# Patient Record
Sex: FEMALE | Race: WHITE | Employment: OTHER | URBAN - NONMETROPOLITAN AREA
[De-identification: names, ages, dates, MRNs, and addresses within clinical notes are randomized per-mention and may not be internally consistent; named-entity substitution may affect disease eponyms.]

---

## 2021-07-31 ENCOUNTER — HOSPITAL ENCOUNTER (INPATIENT)
Age: 61
LOS: 3 days | Discharge: HOME | DRG: 690 | End: 2021-08-03
Attending: EMERGENCY MEDICINE | Admitting: HOSPITALIST
Payer: MEDICARE

## 2021-07-31 ENCOUNTER — APPOINTMENT (OUTPATIENT)
Dept: GENERAL RADIOLOGY | Age: 61
DRG: 690 | End: 2021-07-31
Payer: MEDICARE

## 2021-07-31 DIAGNOSIS — R53.1 GENERALIZED WEAKNESS: ICD-10-CM

## 2021-07-31 DIAGNOSIS — N30.00 ACUTE CYSTITIS WITHOUT HEMATURIA: Primary | ICD-10-CM

## 2021-07-31 DIAGNOSIS — N17.9 AKI (ACUTE KIDNEY INJURY) (HCC): ICD-10-CM

## 2021-07-31 DIAGNOSIS — R41.82 ALTERED MENTAL STATUS, UNSPECIFIED ALTERED MENTAL STATUS TYPE: ICD-10-CM

## 2021-07-31 LAB
ALBUMIN SERPL-MCNC: 2.9 G/DL (ref 3.5–5.2)
ALP BLD-CCNC: 127 U/L (ref 35–104)
ALT SERPL-CCNC: 31 U/L (ref 5–33)
ANION GAP SERPL CALCULATED.3IONS-SCNC: 13 MMOL/L (ref 7–19)
AST SERPL-CCNC: 44 U/L (ref 5–32)
BACTERIA: ABNORMAL /HPF
BANDED NEUTROPHILS RELATIVE PERCENT: 4 % (ref 0–5)
BASOPHILS ABSOLUTE: 0 K/UL (ref 0–0.2)
BASOPHILS RELATIVE PERCENT: 0 % (ref 0–1)
BILIRUB SERPL-MCNC: 0.4 MG/DL (ref 0.2–1.2)
BILIRUBIN URINE: NEGATIVE
BLOOD, URINE: ABNORMAL
BUN BLDV-MCNC: 28 MG/DL (ref 8–23)
CALCIUM SERPL-MCNC: 8.1 MG/DL (ref 8.8–10.2)
CHLORIDE BLD-SCNC: 106 MMOL/L (ref 98–111)
CLARITY: ABNORMAL
CO2: 16 MMOL/L (ref 22–29)
COLOR: YELLOW
CREAT SERPL-MCNC: 1.5 MG/DL (ref 0.5–0.9)
EOSINOPHILS ABSOLUTE: 0.09 K/UL (ref 0–0.6)
EOSINOPHILS RELATIVE PERCENT: 1 % (ref 0–5)
EPITHELIAL CELLS, UA: ABNORMAL /HPF
GFR AFRICAN AMERICAN: 43
GFR NON-AFRICAN AMERICAN: 35
GLUCOSE BLD-MCNC: 111 MG/DL (ref 74–109)
GLUCOSE URINE: NEGATIVE MG/DL
HCT VFR BLD CALC: 29.2 % (ref 37–47)
HEMOGLOBIN: 9.3 G/DL (ref 12–16)
IMMATURE GRANULOCYTES #: 0.1 K/UL
KETONES, URINE: NEGATIVE MG/DL
LEUKOCYTE ESTERASE, URINE: ABNORMAL
LYMPHOCYTES ABSOLUTE: 0.8 K/UL (ref 1.1–4.5)
LYMPHOCYTES RELATIVE PERCENT: 9 % (ref 20–40)
MCH RBC QN AUTO: 30.2 PG (ref 27–31)
MCHC RBC AUTO-ENTMCNC: 31.8 G/DL (ref 33–37)
MCV RBC AUTO: 94.8 FL (ref 81–99)
METAMYELOCYTES RELATIVE PERCENT: 2 %
MONOCYTES ABSOLUTE: 0.2 K/UL (ref 0–0.9)
MONOCYTES RELATIVE PERCENT: 2 % (ref 0–10)
NEUTROPHILS ABSOLUTE: 8.3 K/UL (ref 1.5–7.5)
NEUTROPHILS RELATIVE PERCENT: 82 % (ref 50–65)
NITRITE, URINE: POSITIVE
PDW BLD-RTO: 13.8 % (ref 11.5–14.5)
PH UA: 7 (ref 5–8)
PLATELET # BLD: 146 K/UL (ref 130–400)
PLATELET SLIDE REVIEW: ABNORMAL
PMV BLD AUTO: 12 FL (ref 9.4–12.3)
POTASSIUM SERPL-SCNC: 3.6 MMOL/L (ref 3.5–5)
PROTEIN UA: 100 MG/DL
RBC # BLD: 3.08 M/UL (ref 4.2–5.4)
RBC # BLD: NORMAL 10*6/UL
RBC UA: ABNORMAL /HPF (ref 0–2)
SARS-COV-2, NAAT: NOT DETECTED
SODIUM BLD-SCNC: 135 MMOL/L (ref 136–145)
SPECIFIC GRAVITY UA: 1.01 (ref 1–1.03)
TOTAL PROTEIN: 6 G/DL (ref 6.6–8.7)
UROBILINOGEN, URINE: 1 E.U./DL
VACUOLATED NEUTROPHILS: ABNORMAL
WBC # BLD: 9.4 K/UL (ref 4.8–10.8)
WBC UA: ABNORMAL /HPF (ref 0–5)

## 2021-07-31 PROCEDURE — 2580000003 HC RX 258: Performed by: EMERGENCY MEDICINE

## 2021-07-31 PROCEDURE — 71045 X-RAY EXAM CHEST 1 VIEW: CPT

## 2021-07-31 PROCEDURE — 85025 COMPLETE CBC W/AUTO DIFF WBC: CPT

## 2021-07-31 PROCEDURE — 6360000002 HC RX W HCPCS: Performed by: HOSPITALIST

## 2021-07-31 PROCEDURE — 93005 ELECTROCARDIOGRAM TRACING: CPT

## 2021-07-31 PROCEDURE — 80053 COMPREHEN METABOLIC PANEL: CPT

## 2021-07-31 PROCEDURE — 1210000000 HC MED SURG R&B

## 2021-07-31 PROCEDURE — 6360000002 HC RX W HCPCS: Performed by: EMERGENCY MEDICINE

## 2021-07-31 PROCEDURE — 87635 SARS-COV-2 COVID-19 AMP PRB: CPT

## 2021-07-31 PROCEDURE — 87186 SC STD MICRODIL/AGAR DIL: CPT

## 2021-07-31 PROCEDURE — 2580000003 HC RX 258: Performed by: HOSPITALIST

## 2021-07-31 PROCEDURE — 87077 CULTURE AEROBIC IDENTIFY: CPT

## 2021-07-31 PROCEDURE — 87086 URINE CULTURE/COLONY COUNT: CPT

## 2021-07-31 PROCEDURE — 36415 COLL VENOUS BLD VENIPUNCTURE: CPT

## 2021-07-31 PROCEDURE — 81001 URINALYSIS AUTO W/SCOPE: CPT

## 2021-07-31 PROCEDURE — 99284 EMERGENCY DEPT VISIT MOD MDM: CPT

## 2021-07-31 RX ORDER — LEVOTHYROXINE SODIUM 0.1 MG/1
50 TABLET ORAL DAILY
COMMUNITY

## 2021-07-31 RX ORDER — TOPIRAMATE 25 MG/1
50 TABLET ORAL DAILY
COMMUNITY
End: 2022-07-20 | Stop reason: ALTCHOICE

## 2021-07-31 RX ORDER — POTASSIUM CHLORIDE 20 MEQ/1
40 TABLET, EXTENDED RELEASE ORAL PRN
Status: DISCONTINUED | OUTPATIENT
Start: 2021-07-31 | End: 2021-08-03 | Stop reason: HOSPADM

## 2021-07-31 RX ORDER — ONDANSETRON 4 MG/1
4 TABLET, ORALLY DISINTEGRATING ORAL EVERY 8 HOURS PRN
Status: DISCONTINUED | OUTPATIENT
Start: 2021-07-31 | End: 2021-08-03 | Stop reason: HOSPADM

## 2021-07-31 RX ORDER — POLYETHYLENE GLYCOL 3350 17 G/17G
17 POWDER, FOR SOLUTION ORAL DAILY PRN
Status: DISCONTINUED | OUTPATIENT
Start: 2021-07-31 | End: 2021-08-03 | Stop reason: HOSPADM

## 2021-07-31 RX ORDER — BUPROPION HYDROCHLORIDE 200 MG/1
200 TABLET, EXTENDED RELEASE ORAL DAILY
COMMUNITY
End: 2022-10-05 | Stop reason: ALTCHOICE

## 2021-07-31 RX ORDER — ACETAMINOPHEN 650 MG/1
650 SUPPOSITORY RECTAL EVERY 6 HOURS PRN
Status: DISCONTINUED | OUTPATIENT
Start: 2021-07-31 | End: 2021-08-03 | Stop reason: HOSPADM

## 2021-07-31 RX ORDER — 0.9 % SODIUM CHLORIDE 0.9 %
1000 INTRAVENOUS SOLUTION INTRAVENOUS ONCE
Status: COMPLETED | OUTPATIENT
Start: 2021-07-31 | End: 2021-07-31

## 2021-07-31 RX ORDER — ACETAMINOPHEN 325 MG/1
650 TABLET ORAL EVERY 6 HOURS PRN
Status: DISCONTINUED | OUTPATIENT
Start: 2021-07-31 | End: 2021-08-03 | Stop reason: HOSPADM

## 2021-07-31 RX ORDER — ESOMEPRAZOLE MAGNESIUM 40 MG/1
40 FOR SUSPENSION ORAL 2 TIMES DAILY
COMMUNITY

## 2021-07-31 RX ORDER — SODIUM CHLORIDE 9 MG/ML
INJECTION, SOLUTION INTRAVENOUS CONTINUOUS
Status: DISCONTINUED | OUTPATIENT
Start: 2021-07-31 | End: 2021-08-01

## 2021-07-31 RX ORDER — CLONAZEPAM 1 MG/1
1 TABLET ORAL 3 TIMES DAILY
COMMUNITY

## 2021-07-31 RX ORDER — CYANOCOBALAMIN 1000 UG/ML
1000 INJECTION INTRAMUSCULAR; SUBCUTANEOUS
COMMUNITY

## 2021-07-31 RX ORDER — SODIUM CHLORIDE 0.9 % (FLUSH) 0.9 %
5-40 SYRINGE (ML) INJECTION EVERY 12 HOURS SCHEDULED
Status: DISCONTINUED | OUTPATIENT
Start: 2021-07-31 | End: 2021-08-03 | Stop reason: HOSPADM

## 2021-07-31 RX ORDER — ATORVASTATIN CALCIUM 40 MG/1
40 TABLET, FILM COATED ORAL DAILY
COMMUNITY

## 2021-07-31 RX ORDER — SERTRALINE HYDROCHLORIDE 100 MG/1
200 TABLET, FILM COATED ORAL DAILY
COMMUNITY
End: 2022-07-20 | Stop reason: ALTCHOICE

## 2021-07-31 RX ORDER — QUETIAPINE FUMARATE 200 MG/1
200 TABLET, FILM COATED ORAL NIGHTLY
COMMUNITY

## 2021-07-31 RX ORDER — HYDROCODONE BITARTRATE AND ACETAMINOPHEN 10; 325 MG/1; MG/1
1 TABLET ORAL EVERY 8 HOURS PRN
COMMUNITY

## 2021-07-31 RX ORDER — ONDANSETRON 2 MG/ML
4 INJECTION INTRAMUSCULAR; INTRAVENOUS EVERY 6 HOURS PRN
Status: DISCONTINUED | OUTPATIENT
Start: 2021-07-31 | End: 2021-08-03 | Stop reason: HOSPADM

## 2021-07-31 RX ORDER — ONDANSETRON 8 MG/1
8 TABLET, ORALLY DISINTEGRATING ORAL EVERY 8 HOURS PRN
COMMUNITY

## 2021-07-31 RX ORDER — RIZATRIPTAN BENZOATE 10 MG/1
10 TABLET ORAL
COMMUNITY

## 2021-07-31 RX ORDER — MAGNESIUM SULFATE IN WATER 40 MG/ML
2000 INJECTION, SOLUTION INTRAVENOUS PRN
Status: DISCONTINUED | OUTPATIENT
Start: 2021-07-31 | End: 2021-08-03 | Stop reason: HOSPADM

## 2021-07-31 RX ORDER — GABAPENTIN 300 MG/1
600 CAPSULE ORAL NIGHTLY
COMMUNITY

## 2021-07-31 RX ORDER — POTASSIUM CHLORIDE 7.45 MG/ML
10 INJECTION INTRAVENOUS PRN
Status: DISCONTINUED | OUTPATIENT
Start: 2021-07-31 | End: 2021-08-03 | Stop reason: HOSPADM

## 2021-07-31 RX ORDER — CELECOXIB 200 MG/1
200 CAPSULE ORAL 2 TIMES DAILY
COMMUNITY

## 2021-07-31 RX ORDER — SODIUM CHLORIDE 9 MG/ML
25 INJECTION, SOLUTION INTRAVENOUS PRN
Status: DISCONTINUED | OUTPATIENT
Start: 2021-07-31 | End: 2021-08-03 | Stop reason: HOSPADM

## 2021-07-31 RX ORDER — SODIUM CHLORIDE 0.9 % (FLUSH) 0.9 %
5-40 SYRINGE (ML) INJECTION PRN
Status: DISCONTINUED | OUTPATIENT
Start: 2021-07-31 | End: 2021-08-03 | Stop reason: HOSPADM

## 2021-07-31 RX ADMIN — ENOXAPARIN SODIUM 40 MG: 40 INJECTION SUBCUTANEOUS at 18:54

## 2021-07-31 RX ADMIN — SODIUM CHLORIDE 1000 ML: 9 INJECTION, SOLUTION INTRAVENOUS at 12:07

## 2021-07-31 RX ADMIN — SODIUM CHLORIDE: 9 INJECTION, SOLUTION INTRAVENOUS at 18:55

## 2021-07-31 RX ADMIN — CEFTRIAXONE 1000 MG: 1 INJECTION, POWDER, FOR SOLUTION INTRAMUSCULAR; INTRAVENOUS at 12:46

## 2021-07-31 ASSESSMENT — ENCOUNTER SYMPTOMS
BACK PAIN: 0
COUGH: 1
VOMITING: 0
DIARRHEA: 0
ABDOMINAL PAIN: 0
NAUSEA: 0
SHORTNESS OF BREATH: 0

## 2021-07-31 NOTE — Clinical Note
Patient Class: Inpatient [101]   REQUIRED: Diagnosis: AMS (altered mental status) [[id number]]   Estimated Length of Stay: Estimated stay of more than 2 midnights   Future Attending Provider: Kim Cameron [[id number]]   Admitting Provider: iKm Cameron [[id number]]

## 2021-07-31 NOTE — ED NOTES
Bed: 04  Expected date:   Expected time:   Means of arrival:   Comments:  NGOC Hubbard  07/31/21 1041

## 2021-07-31 NOTE — ED PROVIDER NOTES
[facility] EMERGENCY DEPT  eMERGENCY dEPARTMENT eNCOUnter      Pt Name: Tracey Hoyt  MRN: [id number]  [place] 1960  Date of evaluation: 7/31/2021  Provider: Heladio Jackson MD    [address] [place] [address]       Chief Complaint   Patient presents with    Fatigue    Fever         HISTORY OF PRESENT ILLNESS   (Location/Symptom, Timing/Onset,Context/Setting, Quality, Duration, Modifying Factors, Severity)  Note limiting factors. Tracey Hoyt is a 57 y.o. female who presents to the emergency department for generalized weakness and fatigue. Per the patient she began to feel somewhat dizzy and fatigued on Thursday. She had a T-max of 103 and had a fever of 101.5 this morning. She admits to mild cough. She denies any UTI symptoms. No GI symptoms. HPI    NursingNotes were reviewed. REVIEW OF SYSTEMS    (2-9 systems for level 4, 10 or more for level 5)     Review of Systems   Constitutional: Positive for fever. Negative for chills. Respiratory: Positive for cough. Negative for shortness of breath. Cardiovascular: Negative for chest pain. Gastrointestinal: Negative for abdominal pain, diarrhea, nausea and vomiting. Genitourinary: Negative for dysuria and frequency. Musculoskeletal: Negative for back pain and neck pain. Neurological: Negative for dizziness and headaches. Psychiatric/Behavioral: Positive for confusion. All other systems reviewed and are negative. PAST MEDICALHISTORY     Past Medical History:   Diagnosis Date    Bipolar 1 disorder ([facility] [address]     Depression     Hyperlipidemia          SURGICAL HISTORY       Past Surgical History:   Procedure Laterality Date    HAND SURGERY      KNEE SURGERY Bilateral          CURRENT MEDICATIONS     Previous Medications    No medications on file       ALLERGIES     Sulfa antibiotics    FAMILY HISTORY     History reviewed. No pertinent family history.        SOCIAL HISTORY       Social History     Socioeconomic History    Marital status:      Spouse name: None    Number of children: None    Years of education: None    Highest education level: None   Occupational History    None   Tobacco Use    Smoking status: Never Smoker    Smokeless tobacco: Never Used   Substance and Sexual Activity    Alcohol use: Not Currently    Drug use: Yes     Types: Marijuana     Comment: occasional    Sexual activity: None   Other Topics Concern    None   Social History Narrative    None     Social Determinants of Health     Financial Resource Strain:     Difficulty of Paying Living Expenses:    Food Insecurity:     Worried About Running Out of Food in the Last Year:     Ran Out of Food in the Last Year:    Transportation Needs:     Lack of Transportation (Medical):  Lack of Transportation (Non-Medical):    Physical Activity:     Days of Exercise per Week:     Minutes of Exercise per Session:    Stress:     Feeling of Stress :    Social Connections:     Frequency of Communication with Friends and Family:     Frequency of Social Gatherings with Friends and Family:     Attends [employer] Services:     Active Member of Clubs or Organizations:     Attends Club or Organization Meetings:     Marital Status:    Intimate Partner Violence:     Fear of Current or Ex-Partner:     Emotionally Abused:     Physically Abused:     Sexually Abused:        SCREENINGS    [place] Coma Scale  Eye Opening: Spontaneous  Best Verbal Response: Oriented  Best Motor Response: Obeys commands  [place] Coma Scale Score: 15        PHYSICAL EXAM    (up to 7 for level 4, 8 or more for level 5)     ED Triage Vitals   BP Temp Temp Source Pulse Resp SpO2 Height Weight   07/31/21 1049 07/31/21 1051 07/31/21 1051 07/31/21 1049 07/31/21 1049 07/31/21 1049 07/31/21 1049 07/31/21 1049   104/63 98.6 °F (37 °C) Oral 89 18 (!) 89 % 5' 4\" (1.626 m) 142 lb (64.4 kg)       Physical Exam  Vitals and nursing note reviewed. Constitutional:       General: She is not in acute distress.      Appearance: Normal appearance. She is well-developed. She is ill-appearing. She is not diaphoretic. HENT:      Head: Normocephalic and atraumatic. Right Ear: External ear normal.      Left Ear: External ear normal.      Nose: Nose normal.      Mouth/Throat:      Mouth: Mucous membranes are moist.   Eyes:      Extraocular Movements: Extraocular movements intact. Conjunctiva/sclera: Conjunctivae normal.      Pupils: Pupils are equal, round, and reactive to light. Neck:      Trachea: No tracheal deviation. Cardiovascular:      Rate and Rhythm: Normal rate and regular rhythm. Heart sounds: Normal heart sounds. No murmur heard. Pulmonary:      Effort: No respiratory distress. Breath sounds: Normal breath sounds. No wheezing or rales. Abdominal:      Palpations: Abdomen is soft. There is no mass. Tenderness: There is no abdominal tenderness. Musculoskeletal:         General: Normal range of motion. Cervical back: Normal range of motion. Skin:     General: Skin is warm and dry. Neurological:      Mental Status: She is alert and oriented to person, place, and time. GCS: GCS eye subscore is 4. GCS verbal subscore is 5. GCS motor subscore is 6. Cranial Nerves: No dysarthria or facial asymmetry. Sensory: Sensation is intact. Motor: No abnormal muscle tone. Coordination: Coordination normal.         DIAGNOSTIC RESULTS     EKG: All EKG's areinterpreted by the Emergency Department Physician who either signs or Co-signs this chart in the absence of a cardiologist.    75 normal sinus rhythm no ST changes nondiagnostic EKG    RADIOLOGY:  Non-plain film images such as CT, Ultrasound and MRI are read by the radiologist. Plain radiographic images are visualized and preliminarily interpreted bythe emergency physician with the below findings:          XR CHEST PORTABLE   Final Result   1. No acute cardiopulmonary finding.     Signed by Dr Bailey Saenz:  Labs Reviewed   CBC WITH AUTO DIFFERENTIAL - Abnormal; Notable for the following components:       Result Value    RBC 3.08 (*)     Hemoglobin 9.3 (*)     Hematocrit 29.2 (*)     MCHC 31.8 (*)     Neutrophils % 82.0 (*)     Lymphocytes % 9.0 (*)     Neutrophils Absolute 8.3 (*)     Lymphocytes Absolute 0.8 (*)     Metamyelocytes Relative 2 (*)     Vacuolated Neutrophils 1+ (*)     All other components within normal limits   COMPREHENSIVE METABOLIC PANEL - Abnormal; Notable for the following components:    Sodium 135 (*)     CO2 16 (*)     Glucose 111 (*)     BUN 28 (*)     CREATININE 1.5 (*)     GFR Non- 35 (*)     GFR  43 (*)     Calcium 8.1 (*)     Total Protein 6.0 (*)     Albumin 2.9 (*)     Alkaline Phosphatase 127 (*)     AST 44 (*)     All other components within normal limits   URINE RT REFLEX TO CULTURE - Abnormal; Notable for the following components:    Clarity, UA CLOUDY (*)     Blood, Urine LARGE (*)     Protein,  (*)     Nitrite, Urine POSITIVE (*)     Leukocyte Esterase, Urine LARGE (*)     All other components within normal limits   MICROSCOPIC URINALYSIS - Abnormal; Notable for the following components:    WBC, UA 50-75 (*)     Bacteria, UA 2+ (*)     All other components within normal limits   COVID-19, RAPID   CULTURE, URINE       All other labs were within normal range or not returned as of this dictation.     EMERGENCY DEPARTMENT COURSE and DIFFERENTIAL DIAGNOSIS/MDM:   Vitals:    Vitals:    07/31/21 1051 07/31/21 1105 07/31/21 1200 07/31/21 1300   BP:  110/66 106/62 100/64   Pulse:  84 82 84   Resp:  18 17 18   Temp: 98.6 °F (37 °C)      TempSrc: Oral      SpO2:  93% 93% 94%   Weight:       Height:           MDM  Number of Diagnoses or Management Options     Amount and/or Complexity of Data Reviewed  Clinical lab tests: ordered and reviewed  Tests in the radiology section of CPT®: ordered and reviewed  Discuss the patient with other providers: yes  Independent visualization of images, tracings, or specimens: yes      VSS, pt ill appearing mild encephalopathy here slow to answer questions and somewhat sleepy, UTI and JEFF main issues and likely etiology, have d/w Dr. Edwin Perez for admission      CONSULTS:  None    PROCEDURES:  Unless otherwise noted below, none     Procedures    FINAL IMPRESSION      1. Acute cystitis without hematuria    2. Generalized weakness    3. Altered mental status, unspecified altered mental status type    4. JEFF (acute kidney injury) [facility])          DISPOSITION/PLAN   DISPOSITION Decision To Admit 07/31/2021 01:31:21 PM      PATIENT REFERRED TO:  No follow-up provider specified.     DISCHARGE MEDICATIONS:  New Prescriptions    No medications on file          (Please note that portions of this note were completed with a voice recognition program.  Efforts were made to edit thedictations but occasionally words are mis-transcribed.)    Joel Katz MD (electronically signed)  Attending Emergency Physician        Fernando Burns MD  07/31/21 9981

## 2021-07-31 NOTE — H&P
HISTORY AND PHYSICAL             Date: 7/31/2021        Patient Name: Alana Pressley     YOB: 1960      Age:  57 y.o. Chief Complaint     Chief Complaint   Patient presents with    Fatigue    Fever       History Obtained From   patient    History of Present Illness     43-year-old lady with a history of bipolar 1 disorder, depression, hyperlipidemia, presents to the hospital with concerns of generalized weakness and fever. Since she has been progressively feeling dizzy since Thursday needing some more generalized weakness, today she took her temperature at home which was 103, took ibuprofen with improvement to 101. She denied any recent travel, she denied any cough, he denied any dysuria urgency frequency. Due to continuous dizziness presented to emergency room for further evaluation. In the ED blood pressure noted to be in the mid 100, lab elevated BUN of 29 creatinine of 1.5, white blood cell normal at 9.4, urinalysis grossly positive for urinary tract infection was given ceftriaxone 1 L of fluid and admitted for further evaluation. Past Medical History     Past Medical History:   Diagnosis Date    Bipolar 1 disorder ([facility] [address]     Depression     Hyperlipidemia         Past Surgical History     Past Surgical History:   Procedure Laterality Date    HAND SURGERY      KNEE SURGERY Bilateral         Medications Prior to Admission     Prior to Admission medications    Not on File        Allergies   Sulfa antibiotics    Social History     Social History     Tobacco History     Smoking Status  Never Smoker    Smokeless Tobacco Use  Never Used          Alcohol History     Alcohol Use Status  Not Currently          Drug Use     Drug Use Status  Yes Types  Marijuana Comment  occasional          Sexual Activity     Sexually Active  Not Asked                Family History   History reviewed. No pertinent family history.     Review of Systems   Review of Systems: 16 point system reviewed and negative suggested above. Physical Exam   /62   Pulse 78   Temp 98.6 °F (37 °C) (Oral)   Resp 17   Ht 5' 4\" (1.626 m)   Wt 142 lb (64.4 kg)   SpO2 94%   BMI 24.37 kg/m²         Physical Exam  General: Alert, well-developed, no acute distress lying comfortably in bed. HEENT: Atraumatic normocephalic, range of motion normal, no JVD, no tracheal deviation noted. Cardiac: Normal S1-S2 no murmurs rub or gallop. Respiratory: Effort normal, breath sounds normal, clear To auscultation bilaterally, no rhonchi or rales, no wheezing  Abdomen: Soft, positive bowel sounds in all quadrants, no distention, nontender to palpation, no organomegaly noted, no rebound noted, no CVA tenderness. MSK/extremities: no tenderness, no edema, no deformity, moves all extremities  Skin: Warm, no erythema noted, no bruising and no lesions noted. Psych: Affect flat and good eye contact, behavioral normal  Neurological: No focal deficits, alert and conversant, no formal disorientation noted. No sensory deficits, no abnormal coordination.         Labs      Recent Results (from the past 24 hour(s))   CBC Auto Differential    Collection Time: 07/31/21 10:45 AM   Result Value Ref Range    WBC 9.4 4.8 - 10.8 K/uL    RBC 3.08 (L) 4.20 - 5.40 M/uL    Hemoglobin 9.3 (L) 12.0 - 16.0 g/dL    Hematocrit 29.2 (L) 37.0 - 47.0 %    MCV 94.8 81.0 - 99.0 fL    MCH 30.2 27.0 - 31.0 pg    MCHC 31.8 (L) 33.0 - 37.0 g/dL    RDW 13.8 11.5 - 14.5 %    Platelets 205 930 - 576 K/uL    MPV 12.0 9.4 - 12.3 fL    PLATELET SLIDE REVIEW Decreased     Neutrophils % 82.0 (H) 50.0 - 65.0 %    Lymphocytes % 9.0 (L) 20.0 - 40.0 %    Monocytes % 2.0 0.0 - 10.0 %    Eosinophils % 1.0 0.0 - 5.0 %    Basophils % 0.0 0.0 - 1.0 %    Neutrophils Absolute 8.3 (H) 1.5 - 7.5 K/uL    Immature Granulocytes # 0.1 K/uL    Lymphocytes Absolute 0.8 (L) 1.1 - 4.5 K/uL    Monocytes Absolute 0.20 0.00 - 0.90 K/uL    Eosinophils Absolute 0.09 0.00 - 0.60 K/uL    Basophils Absolute 0.00 0.00 - 0.20 K/uL    Bands Relative 4 0 - 5 %    Metamyelocytes Relative 2 (A) %    Vacuolated Neutrophils 1+ (A)     RBC Morphology Normal    Comprehensive Metabolic Panel    Collection Time: 07/31/21 10:45 AM   Result Value Ref Range    Sodium 135 (L) 136 - 145 mmol/L    Potassium 3.6 3.5 - 5.0 mmol/L    Chloride 106 98 - 111 mmol/L    CO2 16 (L) 22 - 29 mmol/L    Anion Gap 13 7 - 19 mmol/L    Glucose 111 (H) 74 - 109 mg/dL    BUN 28 (H) 8 - 23 mg/dL    CREATININE 1.5 (H) 0.5 - 0.9 mg/dL    GFR Non-African American 35 (A) >60    GFR  43 (L) >59    Calcium 8.1 (L) 8.8 - 10.2 mg/dL    Total Protein 6.0 (L) 6.6 - 8.7 g/dL    Albumin 2.9 (L) 3.5 - 5.2 g/dL    Total Bilirubin 0.4 0.2 - 1.2 mg/dL    Alkaline Phosphatase 127 (H) 35 - 104 U/L    ALT 31 5 - 33 U/L    AST 44 (H) 5 - 32 U/L   Urinalysis Reflex to Culture    Collection Time: 07/31/21 11:56 AM    Specimen: Urine, clean catch   Result Value Ref Range    Color, UA YELLOW Straw/Yellow    Clarity, UA CLOUDY (A) Clear    Glucose, Ur Negative Negative mg/dL    Bilirubin Urine Negative Negative    Ketones, Urine Negative Negative mg/dL    Specific Gravity, UA 1.009 1.005 - 1.030    Blood, Urine LARGE (A) Negative    pH, UA 7.0 5.0 - 8.0    Protein,  (A) Negative mg/dL    Urobilinogen, Urine 1.0 <2.0 E.U./dL    Nitrite, Urine POSITIVE (A) Negative    Leukocyte Esterase, Urine LARGE (A) Negative   COVID-19, Rapid    Collection Time: 07/31/21 11:56 AM    Specimen: Nasopharyngeal Swab   Result Value Ref Range    SARS-CoV-2, NAAT Not Detected Not Detected   Microscopic Urinalysis    Collection Time: 07/31/21 11:56 AM   Result Value Ref Range    WBC, UA 50-75 (A) 0 - 5 /HPF    RBC, UA 2-4 0 - 2 /HPF    Epithelial Cells, UA 3-5 /HPF    Bacteria, UA 2+ (A) None Seen /HPF        Imaging/Diagnostics Last 24 Hours   XR CHEST PORTABLE    Result Date: 7/31/2021  EXAM: XR CHEST PORTABLE -- 7/31/2021 12:00 PM HISTORY: 61 years, Female, fatigue, fever COMPARISON: 1/8/2014 TECHNIQUE:  1 images. Frontal view of the chest. FINDINGS:  No pneumothorax, pleural effusion or focal consolidation. Normal cardiac and mediastinal contours. No acute bony finding. Cholecystectomy clips. 1. No acute cardiopulmonary finding. Signed by Dr Patrick Hurst (altered mental status) 7/31/2021 Yes          Plan       Generalized weakness with fever in setting of urinary tract infection  Status post ceftriaxone in the emergency room will continue  Follow urine culture  Fluids at 125 cc an hour  PT OT eval  Tylenol for fever    JEFF  Continue IV fluids as ordered above  If no significant improvement in renal function will obtain urine lites and further work-up. History of bipolar 1 disorder as well as depression  Currently on not on any medication wanted to come from his PCPs office. Code: Full  DVT prophylaxis: Enoxaparin  Diet: Regular  Disposition: Pending completion of work-up.   Consultations Ordered:  None    Electronically signed by Flako Christine MD on 7/31/21 at 3:23 PM CDT

## 2021-08-01 LAB
ANION GAP SERPL CALCULATED.3IONS-SCNC: 16 MMOL/L (ref 7–19)
BUN BLDV-MCNC: 23 MG/DL (ref 8–23)
CALCIUM SERPL-MCNC: 7.5 MG/DL (ref 8.8–10.2)
CHLORIDE BLD-SCNC: 106 MMOL/L (ref 98–111)
CO2: 13 MMOL/L (ref 22–29)
CREAT SERPL-MCNC: 1.4 MG/DL (ref 0.5–0.9)
FOLATE: 15.9 NG/ML (ref 4.8–37.3)
GFR AFRICAN AMERICAN: 46
GFR NON-AFRICAN AMERICAN: 38
GLUCOSE BLD-MCNC: 69 MG/DL (ref 74–109)
HCT VFR BLD CALC: 29.4 % (ref 37–47)
HEMOGLOBIN: 9.2 G/DL (ref 12–16)
MAGNESIUM: 2.2 MG/DL (ref 1.6–2.4)
MCH RBC QN AUTO: 30.5 PG (ref 27–31)
MCHC RBC AUTO-ENTMCNC: 31.3 G/DL (ref 33–37)
MCV RBC AUTO: 97.4 FL (ref 81–99)
PDW BLD-RTO: 14.3 % (ref 11.5–14.5)
PLATELET # BLD: 139 K/UL (ref 130–400)
PMV BLD AUTO: 12.3 FL (ref 9.4–12.3)
POTASSIUM REFLEX MAGNESIUM: 3.2 MMOL/L (ref 3.5–5)
RBC # BLD: 3.02 M/UL (ref 4.2–5.4)
SODIUM BLD-SCNC: 135 MMOL/L (ref 136–145)
T4 FREE: 0.81 NG/DL (ref 0.93–1.7)
TSH REFLEX FT4: 0.23 UIU/ML (ref 0.35–5.5)
VITAMIN B-12: >2000 PG/ML (ref 211–946)
VITAMIN D 25-HYDROXY: 8.4 NG/ML
WBC # BLD: 10.3 K/UL (ref 4.8–10.8)

## 2021-08-01 PROCEDURE — 82306 VITAMIN D 25 HYDROXY: CPT

## 2021-08-01 PROCEDURE — 80048 BASIC METABOLIC PNL TOTAL CA: CPT

## 2021-08-01 PROCEDURE — 2500000003 HC RX 250 WO HCPCS: Performed by: HOSPITALIST

## 2021-08-01 PROCEDURE — 82607 VITAMIN B-12: CPT

## 2021-08-01 PROCEDURE — 85027 COMPLETE CBC AUTOMATED: CPT

## 2021-08-01 PROCEDURE — 83735 ASSAY OF MAGNESIUM: CPT

## 2021-08-01 PROCEDURE — 82746 ASSAY OF FOLIC ACID SERUM: CPT

## 2021-08-01 PROCEDURE — 6370000000 HC RX 637 (ALT 250 FOR IP): Performed by: HOSPITALIST

## 2021-08-01 PROCEDURE — 84443 ASSAY THYROID STIM HORMONE: CPT

## 2021-08-01 PROCEDURE — 2580000003 HC RX 258: Performed by: HOSPITALIST

## 2021-08-01 PROCEDURE — 1210000000 HC MED SURG R&B

## 2021-08-01 PROCEDURE — 36415 COLL VENOUS BLD VENIPUNCTURE: CPT

## 2021-08-01 PROCEDURE — 84439 ASSAY OF FREE THYROXINE: CPT

## 2021-08-01 PROCEDURE — 6360000002 HC RX W HCPCS: Performed by: HOSPITALIST

## 2021-08-01 RX ORDER — SERTRALINE HYDROCHLORIDE 100 MG/1
200 TABLET, FILM COATED ORAL DAILY
Status: DISCONTINUED | OUTPATIENT
Start: 2021-08-01 | End: 2021-08-03 | Stop reason: HOSPADM

## 2021-08-01 RX ORDER — SUMATRIPTAN 25 MG/1
100 TABLET, FILM COATED ORAL
Status: ACTIVE | OUTPATIENT
Start: 2021-08-01 | End: 2021-08-01

## 2021-08-01 RX ORDER — QUETIAPINE FUMARATE 100 MG/1
200 TABLET, FILM COATED ORAL NIGHTLY
Status: DISCONTINUED | OUTPATIENT
Start: 2021-08-01 | End: 2021-08-03 | Stop reason: HOSPADM

## 2021-08-01 RX ORDER — TOPIRAMATE 50 MG/1
50 TABLET, FILM COATED ORAL DAILY
Status: DISCONTINUED | OUTPATIENT
Start: 2021-08-01 | End: 2021-08-03 | Stop reason: HOSPADM

## 2021-08-01 RX ORDER — LEVOTHYROXINE SODIUM 0.05 MG/1
50 TABLET ORAL DAILY
Status: DISCONTINUED | OUTPATIENT
Start: 2021-08-01 | End: 2021-08-03 | Stop reason: HOSPADM

## 2021-08-01 RX ORDER — BUPROPION HYDROCHLORIDE 100 MG/1
200 TABLET, EXTENDED RELEASE ORAL DAILY
Status: DISCONTINUED | OUTPATIENT
Start: 2021-08-01 | End: 2021-08-03 | Stop reason: HOSPADM

## 2021-08-01 RX ORDER — CLONAZEPAM 1 MG/1
1 TABLET ORAL 3 TIMES DAILY PRN
Status: DISCONTINUED | OUTPATIENT
Start: 2021-08-01 | End: 2021-08-03 | Stop reason: HOSPADM

## 2021-08-01 RX ORDER — PANTOPRAZOLE SODIUM 40 MG/1
40 TABLET, DELAYED RELEASE ORAL DAILY
Status: DISCONTINUED | OUTPATIENT
Start: 2021-08-01 | End: 2021-08-03 | Stop reason: HOSPADM

## 2021-08-01 RX ORDER — ATORVASTATIN CALCIUM 40 MG/1
40 TABLET, FILM COATED ORAL DAILY
Status: DISCONTINUED | OUTPATIENT
Start: 2021-08-01 | End: 2021-08-03 | Stop reason: HOSPADM

## 2021-08-01 RX ADMIN — POTASSIUM CHLORIDE: 2 INJECTION, SOLUTION, CONCENTRATE INTRAVENOUS at 19:56

## 2021-08-01 RX ADMIN — TOPIRAMATE 50 MG: 50 TABLET, FILM COATED ORAL at 15:45

## 2021-08-01 RX ADMIN — QUETIAPINE FUMARATE 200 MG: 100 TABLET ORAL at 19:56

## 2021-08-01 RX ADMIN — Medication 5000 UNITS: at 08:56

## 2021-08-01 RX ADMIN — SERTRALINE HYDROCHLORIDE 200 MG: 100 TABLET ORAL at 15:45

## 2021-08-01 RX ADMIN — BUPROPION HYDROCHLORIDE 200 MG: 100 TABLET, FILM COATED, EXTENDED RELEASE ORAL at 15:45

## 2021-08-01 RX ADMIN — PANTOPRAZOLE SODIUM 40 MG: 40 TABLET, DELAYED RELEASE ORAL at 15:45

## 2021-08-01 RX ADMIN — ATORVASTATIN CALCIUM 40 MG: 40 TABLET, FILM COATED ORAL at 15:45

## 2021-08-01 RX ADMIN — ACETAMINOPHEN 650 MG: 325 TABLET ORAL at 13:08

## 2021-08-01 RX ADMIN — ENOXAPARIN SODIUM 40 MG: 40 INJECTION SUBCUTANEOUS at 17:09

## 2021-08-01 RX ADMIN — POTASSIUM CHLORIDE: 2 INJECTION, SOLUTION, CONCENTRATE INTRAVENOUS at 10:14

## 2021-08-01 RX ADMIN — LEVOTHYROXINE SODIUM 50 MCG: 50 TABLET ORAL at 15:45

## 2021-08-01 RX ADMIN — SODIUM CHLORIDE, PRESERVATIVE FREE 10 ML: 5 INJECTION INTRAVENOUS at 19:56

## 2021-08-01 RX ADMIN — ACETAMINOPHEN 650 MG: 325 TABLET ORAL at 20:55

## 2021-08-01 RX ADMIN — CEFTRIAXONE 1000 MG: 1 INJECTION, POWDER, FOR SOLUTION INTRAMUSCULAR; INTRAVENOUS at 13:04

## 2021-08-01 ASSESSMENT — PAIN SCALES - GENERAL
PAINLEVEL_OUTOF10: 3
PAINLEVEL_OUTOF10: 5

## 2021-08-01 NOTE — PROGRESS NOTES
Progress Note  Date:2021       Room:0526/526-01  Patient Name:Tavia Zhang     YOB: 1960     Age:61 y.o. Subjective    Subjective: 78-year-old lady with a history of bipolar 1 disorder, depression, hyperlipidemia, presents to the hospital with concerns of generalized weakness and fever. In the ED blood pressure noted to be in the mid 100, lab elevated BUN of 29 creatinine of 1.5, white blood cell normal at 9.4, urinalysis grossly positive for urinary tract infection was given ceftriaxone 1 L of fluid and admitted for further evaluation.       Patient was seen this morning in her room, denied any acute overnight event./Overall she is feeling a lot better. Denied any chest pain shortness of breath nausea vomiting abdominal pain. Awaiting therapy evaluation. Review of Systems: 12 point system reviewed and negative except as noted above. Objective         Vitals Last 24 Hours:  TEMPERATURE:  Temp  Av.3 °F (36.8 °C)  Min: 97.5 °F (36.4 °C)  Max: 99.2 °F (37.3 °C)  RESPIRATIONS RANGE: Resp  Avg: 15.6  Min: 10  Max: 18  PULSE OXIMETRY RANGE: SpO2  Av.6 %  Min: 92 %  Max: 99 %  PULSE RANGE: Pulse  Av.9  Min: 71  Max: 103  BLOOD PRESSURE RANGE: Systolic (33ISG), XJM:064 , Min:83 , SBM:688   ; Diastolic (02ZSQ), PKD:85, Min:51, Max:65    I/O (24Hr): Intake/Output Summary (Last 24 hours) at 2021 1408  Last data filed at 2021 1015  Gross per 24 hour   Intake 3395 ml   Output --   Net 3395 ml       Physical Exam  General: Alert, well-developed, no acute distress lying comfortably in bed. HEENT: Atraumatic normocephalic, range of motion normal, no JVD, no tracheal deviation noted. Cardiac: Normal S1-S2 no murmurs rub or gallop. Respiratory: Effort normal, breath sounds normal, clear To auscultation bilaterally  Abdomen: Soft, positive bowel sounds in all quadrants, no distention, nontender to palpation.   MSK/extremities: no tenderness, no edema, no deformity, moves all extremities  Skin: Warm, no erythema noted, no bruising and no lesions noted. Psych: Affect flat and good eye contact, behavioral normal  Neurological: No focal deficits, alert and conversant, no formal disorientation noted. No sensory deficits, no abnormal coordination. Labs/Imaging/Diagnostics    Labs:  CBC:  Recent Labs     07/31/21  1045 08/01/21  0654   WBC 9.4 10.3   RBC 3.08* 3.02*   HGB 9.3* 9.2*   HCT 29.2* 29.4*   MCV 94.8 97.4   RDW 13.8 14.3    139     CHEMISTRIES:  Recent Labs     07/31/21  1045 08/01/21  0654   * 135*   K 3.6 3.2*    106   CO2 16* 13*   BUN 28* 23   CREATININE 1.5* 1.4*   GLUCOSE 111* 69*   MG  --  2.2     PT/INR:No results for input(s): PROTIME, INR in the last 72 hours. APTT:No results for input(s): APTT in the last 72 hours. LIVER PROFILE:  Recent Labs     07/31/21  1045   AST 44*   ALT 31   BILITOT 0.4   ALKPHOS 127*       Imaging Last 24 Hours:  XR CHEST PORTABLE    Result Date: 7/31/2021  EXAM: XR CHEST PORTABLE -- 7/31/2021 12:00 PM HISTORY: 61 years, Female, fatigue, fever COMPARISON: 1/8/2014 TECHNIQUE:  1 images. Frontal view of the chest. FINDINGS:  No pneumothorax, pleural effusion or focal consolidation. Normal cardiac and mediastinal contours. No acute bony finding. Cholecystectomy clips. 1. No acute cardiopulmonary finding. Signed by Dr Kristan Pierson    Assessment//Plan           Hospital Problems         Last Modified POA    AMS (altered mental status) 7/31/2021 Yes        Assessment & Plan      Generalized weakness with fever in setting of urinary tract infection  Status post ceftriaxone in the emergency room will continue  Follow urine culture  Fluids at 125 cc an hour  PT OT eval  Tylenol for fever     JEFF: Improving  Metabolic acidosis  Hypokalemia  Continue IV fluids as ordered above  If no significant improvement in renal function will obtain urine lites and further work-up.   Replete electrolytes as needed.     History of bipolar 1 disorder as well as depression  Continue Seroquel, Topamax, Zoloft and bupropion. History of hypothyroidism  TSH noted to be low at 0.23 as well as low T4. Levels could also be related to underlying psych medications.         Code: Full  DVT prophylaxis: Enoxaparin  Diet: Regular  Disposition: Pending completion of work-up. Electronically signed by   Lida Grayson MD   Internal Medicine Hospitalist  On 8/1/2021  At 2:11 PM    EMR Dragon/Transcription disclaimer:   Much of this encounter note is an electronic transcription/translation of spoken language to printed text.  The electronic translation of spoken language may permit erroneous, or at times, nonsensical words or phrases to be inadvertently transcribed; although attempts have made to review the note for such errors, some may still exist.

## 2021-08-02 LAB
ANION GAP SERPL CALCULATED.3IONS-SCNC: 13 MMOL/L (ref 7–19)
BUN BLDV-MCNC: 16 MG/DL (ref 8–23)
CALCIUM SERPL-MCNC: 7.6 MG/DL (ref 8.8–10.2)
CHLORIDE BLD-SCNC: 110 MMOL/L (ref 98–111)
CO2: 14 MMOL/L (ref 22–29)
CREAT SERPL-MCNC: 1.2 MG/DL (ref 0.5–0.9)
GFR AFRICAN AMERICAN: 55
GFR NON-AFRICAN AMERICAN: 46
GLUCOSE BLD-MCNC: 110 MG/DL (ref 74–109)
HCT VFR BLD CALC: 28.2 % (ref 37–47)
HEMOGLOBIN: 9.1 G/DL (ref 12–16)
MAGNESIUM: 1.9 MG/DL (ref 1.6–2.4)
MCH RBC QN AUTO: 30.1 PG (ref 27–31)
MCHC RBC AUTO-ENTMCNC: 32.3 G/DL (ref 33–37)
MCV RBC AUTO: 93.4 FL (ref 81–99)
ORGANISM: ABNORMAL
PDW BLD-RTO: 14.2 % (ref 11.5–14.5)
PLATELET # BLD: 147 K/UL (ref 130–400)
PMV BLD AUTO: 11.9 FL (ref 9.4–12.3)
POTASSIUM REFLEX MAGNESIUM: 3 MMOL/L (ref 3.5–5)
RBC # BLD: 3.02 M/UL (ref 4.2–5.4)
SODIUM BLD-SCNC: 137 MMOL/L (ref 136–145)
URINE CULTURE, ROUTINE: ABNORMAL
URINE CULTURE, ROUTINE: ABNORMAL
WBC # BLD: 11.6 K/UL (ref 4.8–10.8)

## 2021-08-02 PROCEDURE — 1210000000 HC MED SURG R&B

## 2021-08-02 PROCEDURE — 36415 COLL VENOUS BLD VENIPUNCTURE: CPT

## 2021-08-02 PROCEDURE — 2580000003 HC RX 258: Performed by: HOSPITALIST

## 2021-08-02 PROCEDURE — 97161 PT EVAL LOW COMPLEX 20 MIN: CPT

## 2021-08-02 PROCEDURE — 6360000002 HC RX W HCPCS: Performed by: HOSPITALIST

## 2021-08-02 PROCEDURE — 97530 THERAPEUTIC ACTIVITIES: CPT

## 2021-08-02 PROCEDURE — XW033N5 INTRODUCTION OF MEROPENEM-VABORBACTAM ANTI-INFECTIVE INTO PERIPHERAL VEIN, PERCUTANEOUS APPROACH, NEW TECHNOLOGY GROUP 5: ICD-10-PCS | Performed by: HOSPITALIST

## 2021-08-02 PROCEDURE — 85027 COMPLETE CBC AUTOMATED: CPT

## 2021-08-02 PROCEDURE — 83735 ASSAY OF MAGNESIUM: CPT

## 2021-08-02 PROCEDURE — 97165 OT EVAL LOW COMPLEX 30 MIN: CPT

## 2021-08-02 PROCEDURE — 2500000003 HC RX 250 WO HCPCS: Performed by: HOSPITALIST

## 2021-08-02 PROCEDURE — 6370000000 HC RX 637 (ALT 250 FOR IP): Performed by: HOSPITALIST

## 2021-08-02 PROCEDURE — 80048 BASIC METABOLIC PNL TOTAL CA: CPT

## 2021-08-02 PROCEDURE — 97116 GAIT TRAINING THERAPY: CPT

## 2021-08-02 RX ORDER — POTASSIUM CHLORIDE 20 MEQ/1
40 TABLET, EXTENDED RELEASE ORAL ONCE
Status: COMPLETED | OUTPATIENT
Start: 2021-08-02 | End: 2021-08-02

## 2021-08-02 RX ADMIN — ACETAMINOPHEN 650 MG: 325 TABLET ORAL at 18:46

## 2021-08-02 RX ADMIN — MEROPENEM 1000 MG: 1 INJECTION, POWDER, FOR SOLUTION INTRAVENOUS at 09:07

## 2021-08-02 RX ADMIN — ENOXAPARIN SODIUM 40 MG: 40 INJECTION SUBCUTANEOUS at 18:48

## 2021-08-02 RX ADMIN — POTASSIUM CHLORIDE 40 MEQ: 1500 TABLET, EXTENDED RELEASE ORAL at 11:44

## 2021-08-02 RX ADMIN — POTASSIUM CHLORIDE: 2 INJECTION, SOLUTION, CONCENTRATE INTRAVENOUS at 09:06

## 2021-08-02 RX ADMIN — SODIUM CHLORIDE, PRESERVATIVE FREE 10 ML: 5 INJECTION INTRAVENOUS at 09:08

## 2021-08-02 RX ADMIN — TOPIRAMATE 50 MG: 50 TABLET, FILM COATED ORAL at 09:07

## 2021-08-02 RX ADMIN — MEROPENEM 1000 MG: 1 INJECTION, POWDER, FOR SOLUTION INTRAVENOUS at 21:08

## 2021-08-02 RX ADMIN — BUPROPION HYDROCHLORIDE 200 MG: 100 TABLET, FILM COATED, EXTENDED RELEASE ORAL at 09:07

## 2021-08-02 RX ADMIN — POTASSIUM CHLORIDE 40 MEQ: 1500 TABLET, EXTENDED RELEASE ORAL at 06:00

## 2021-08-02 RX ADMIN — LEVOTHYROXINE SODIUM 50 MCG: 50 TABLET ORAL at 05:57

## 2021-08-02 RX ADMIN — SERTRALINE HYDROCHLORIDE 200 MG: 100 TABLET ORAL at 09:07

## 2021-08-02 RX ADMIN — PANTOPRAZOLE SODIUM 40 MG: 40 TABLET, DELAYED RELEASE ORAL at 05:57

## 2021-08-02 RX ADMIN — ATORVASTATIN CALCIUM 40 MG: 40 TABLET, FILM COATED ORAL at 09:07

## 2021-08-02 RX ADMIN — QUETIAPINE FUMARATE 200 MG: 100 TABLET ORAL at 21:08

## 2021-08-02 RX ADMIN — Medication 5000 UNITS: at 09:07

## 2021-08-02 RX ADMIN — SODIUM CHLORIDE, PRESERVATIVE FREE 10 ML: 5 INJECTION INTRAVENOUS at 21:08

## 2021-08-02 RX ADMIN — POTASSIUM CHLORIDE: 2 INJECTION, SOLUTION, CONCENTRATE INTRAVENOUS at 18:46

## 2021-08-02 ASSESSMENT — PAIN SCALES - GENERAL: PAINLEVEL_OUTOF10: 8

## 2021-08-02 NOTE — PROGRESS NOTES
Progress Note  Date:2021       Room:0526/526-01  Patient Name:Tavia Zhang     YOB: 1960     Age:61 y.o. Subjective    Subjective: 70-year-old lady with a history of bipolar 1 disorder, depression, hyperlipidemia, presents to the hospital with concerns of generalized weakness and fever. In the ED blood pressure noted to be in the mid 100, lab elevated BUN of 29 creatinine of 1.5, white blood cell normal at 9.4, urinalysis grossly positive for urinary tract infection was given ceftriaxone 1 L of fluid and admitted for further evaluation. Urine culture growing ESBL, antibiotics transitioned to meropenem. Will benefit from at least three doses of IV antibiotics and transition to oral regimen prior to discharge.       Patient was seen this morning in her room, denied any acute overnight event./Overall she is feeling a lot better. Denied any chest pain shortness of breath nausea vomiting abdominal pain. Seen by therapy team, per documentation patient did well and the patient does not need any more therapy in the hospital.      Review of Systems: 12 point system reviewed and negative except as noted above. Objective         Vitals Last 24 Hours:  TEMPERATURE:  Temp  Av.6 °F (37.6 °C)  Min: 98 °F (36.7 °C)  Max: 101.7 °F (38.7 °C)  RESPIRATIONS RANGE: Resp  Av.8  Min: 16  Max: 18  PULSE OXIMETRY RANGE: SpO2  Av %  Min: 90 %  Max: 96 %  PULSE RANGE: Pulse  Av.2  Min: 85  Max: 98  BLOOD PRESSURE RANGE: Systolic (17TKE), DTF:90 , Min:95 , IAA:035   ; Diastolic (13QQT), IGX:09, Min:55, Max:69    I/O (24Hr): Intake/Output Summary (Last 24 hours) at 2021 1431  Last data filed at 2021 0700  Gross per 24 hour   Intake 1052 ml   Output --   Net 1052 ml       Physical Exam  General: Alert, well-developed, no acute distress lying comfortably in bed. HEENT: Atraumatic normocephalic, range of motion normal, no JVD, no tracheal deviation noted.   Cardiac: Normal S1-S2 no murmurs rub or gallop. Respiratory: Effort normal, breath sounds normal, clear To auscultation bilaterally  Abdomen: Soft, positive bowel sounds in all quadrants, no distention, nontender to palpation. MSK/extremities: no tenderness, no edema, no deformity, moves all extremities  Skin: Warm, no erythema noted, no bruising and no lesions noted. Psych: Affect flat and good eye contact, behavioral normal  Neurological: No focal deficits, alert and conversant, no formal disorientation noted. No sensory deficits, no abnormal coordination. Labs/Imaging/Diagnostics    Labs:  CBC:  Recent Labs     07/31/21  1045 08/01/21  0654 08/02/21  0322   WBC 9.4 10.3 11.6*   RBC 3.08* 3.02* 3.02*   HGB 9.3* 9.2* 9.1*   HCT 29.2* 29.4* 28.2*   MCV 94.8 97.4 93.4   RDW 13.8 14.3 14.2    139 147     CHEMISTRIES:  Recent Labs     07/31/21  1045 08/01/21  0654 08/02/21  0322   * 135* 137   K 3.6 3.2* 3.0*    106 110   CO2 16* 13* 14*   BUN 28* 23 16   CREATININE 1.5* 1.4* 1.2*   GLUCOSE 111* 69* 110*   MG  --  2.2 1.9     PT/INR:No results for input(s): PROTIME, INR in the last 72 hours. APTT:No results for input(s): APTT in the last 72 hours. LIVER PROFILE:  Recent Labs     07/31/21  1045   AST 44*   ALT 31   BILITOT 0.4   ALKPHOS 127*       Imaging Last 24 Hours:  XR CHEST PORTABLE    Result Date: 7/31/2021  EXAM: XR CHEST PORTABLE -- 7/31/2021 12:00 PM HISTORY: 61 years, Female, fatigue, fever COMPARISON: 1/8/2014 TECHNIQUE:  1 images. Frontal view of the chest. FINDINGS:  No pneumothorax, pleural effusion or focal consolidation. Normal cardiac and mediastinal contours. No acute bony finding. Cholecystectomy clips. 1. No acute cardiopulmonary finding.  Signed by Dr Case Dias    Assessment//Plan           Hospital Problems         Last Modified POA    AMS (altered mental status) 7/31/2021 Yes        Assessment & Plan      Generalized weakness with fever in setting of urinary tract infection  Status post ceftriaxone in the emergency room will continue  Urine culture growing ESBL, transitioned to meropenem.    -Patient will benefit from couple doses of IV antibiotics and transition to nitrofurantoin. Fluids at 125 cc an hour  PT OT evaluated. Tylenol for fever     JEFF: Improving  Metabolic acidosis  Hypokalemia: Repleted. Continue IV fluids as ordered above  Replete electrolytes as needed.     History of bipolar 1 disorder as well as depression  Continue Seroquel, Topamax, Zoloft and bupropion. History of hypothyroidism  TSH noted to be low at 0.23 as well as low T4. Levels could also be related to underlying psych medications.         Code: Full  DVT prophylaxis: Enoxaparin  Diet: Regular  Disposition: Likely home tomorrow. Electronically signed by   Frantz Gresham MD   Internal Medicine Hospitalist  On 8/2/2021  At 2:31 PM    EMR Dragon/Transcription disclaimer:   Much of this encounter note is an electronic transcription/translation of spoken language to printed text.  The electronic translation of spoken language may permit erroneous, or at times, nonsensical words or phrases to be inadvertently transcribed; although attempts have made to review the note for such errors, some may still exist.

## 2021-08-02 NOTE — PROGRESS NOTES
Pharmacy Renal Adjustment    Court Margi is a 57 y.o. female. Pharmacy has renally adjusted medications per protocol. Recent Labs     08/01/21  0654 08/02/21  0322   BUN 23 16       Recent Labs     08/01/21  0654 08/02/21  0322   CREATININE 1.4* 1.2*       Estimated Creatinine Clearance: 42 mL/min (A) (based on SCr of 1.2 mg/dL (H)). Height:   Ht Readings from Last 1 Encounters:   07/31/21 5' 4\" (1.626 m)     Weight:  Wt Readings from Last 1 Encounters:   07/31/21 142 lb (64.4 kg)       Plan: Adjust the following medications based on renal function:           Meropenem to 1gm IV q12hr for UTI.     Electronically signed by [facility] on 8/2/2021 at 8:36 AM

## 2021-08-02 NOTE — PROGRESS NOTES
Physical Therapy    Facility/Department: [facility] SURG SERVICES  Initial Assessment    NAME: Bea Harper  : 1960  MRN: [id number]    Date of Service: 2021    Discharge Recommendations:  Patient would benefit from continued therapy after discharge (EDUCATED PATIENT ON POSSIBLE PT SERVICES IF NEEDED UPON D/C BY CONTACTING PRIMARY CARE PHYSICIAN)        Assessment   Assessment: PATIENT WAS ABLE TO TOLERATE THE TREATMENT SESSION WELL. PATIENT WAS ABLE TO PERFORM ALL BED MOBILITY, TRANSFERS, AND AMBULATION WITH INDEPENDENCE WITH NO A.D. PATIENT STATES SHE FEELS LIKE SHE IS STILL A LITTLE WEAK. PATIENT STATED THEY FEEL LIKE THEY DID NOT NEED ANYMORE PT IN THIS SETTING. Prognosis: Good  Decision Making: Low Complexity  PT Education: Goals; General Safety;PT Role  No Skilled PT: Independent with functional mobility   Activity Tolerance  Activity Tolerance: Patient Tolerated treatment well       Patient Diagnosis(es): The primary encounter diagnosis was Acute cystitis without hematuria. Diagnoses of Generalized weakness, Altered mental status, unspecified altered mental status type, and JEFF (acute kidney injury) ([facility] [address] were also pertinent to this visit. has a past medical history of Bipolar 1 disorder ([address] Depression, and Hyperlipidemia. has a past surgical history that includes knee surgery (Bilateral) and Hand surgery.     Restrictions  Restrictions/Precautions  Restrictions/Precautions: Fall Risk  Vision/Hearing  Vision: Impaired  Vision Exceptions: Wears glasses at all times  Hearing: Within functional limits     Subjective  General  Chart Reviewed: Yes  Patient assessed for rehabilitation services?: Yes  Family / Caregiver Present: No  Diagnosis: GENERALIZED WEAKNESS SECONDARY TO UTI, AMS  Follows Commands: Within Functional Limits  Subjective  Subjective: PATIENT WAS WILLING TO PARTICIAPTE IN THERAPEUTIC INTERVENTIONS  Pain Screening  Patient Currently in Pain: Denies  Vital Signs  Patient Currently in Pain: reach      Goals  Short term goals  Time Frame for Short term goals: 1X EVAL ADN TREAT  Short term goal 1: AMBULATE 100 FT WITH THREE TURNS INDEPENDENT (met)       Therapy Time   Individual Concurrent Group Co-treatment   Time In           Time Out           Minutes                   Jaden Pichardo         Electronically signed by RACH Aguirre, on 8/2/2021 at 8:34 AM

## 2021-08-02 NOTE — PROGRESS NOTES
Occupational Therapy   Occupational Therapy Initial Assessment  Date: 2021   Patient Name: Marissa Gomez  MRN: [id number]     : 1960    Date of Service: 2021    Discharge Recommendations:   (Home with intermittent family support)       Assessment   Assessment: Evaluation completed and tx initiated. No ongoing OT services required. No overt barriers for discharge home with intermittent family support  when medically ready. Treatment Diagnosis: AMS, generalize weakness  REQUIRES OT FOLLOW UP: No  Activity Tolerance  Activity Tolerance: Patient Tolerated treatment well  Safety Devices  Safety Devices in place: Yes  Type of devices: Call light within reach; Chair alarm in place           Patient Diagnosis(es): The primary encounter diagnosis was Acute cystitis without hematuria. Diagnoses of Generalized weakness, Altered mental status, unspecified altered mental status type, and JEFF (acute kidney injury) ([facility] [address] were also pertinent to this visit. has a past medical history of Bipolar 1 disorder ([facility] [address] Depression, and Hyperlipidemia. has a past surgical history that includes knee surgery (Bilateral) and Hand surgery.     Treatment Diagnosis: AMS, generalize weakness      Restrictions  Restrictions/Precautions  Restrictions/Precautions: Fall Risk    Subjective   General  Chart Reviewed: Yes  Patient assessed for rehabilitation services?: Yes  Family / Caregiver Present: No  Patient Currently in Pain: No  Vital Signs  Temp: 97.6 °F (36.4 °C)  Temp Source: Temporal  Pulse: 96  Heart Rate Source: Monitor  Resp: 16  BP: 110/72  BP Location: Right upper arm  Patient Currently in Pain: No  Oxygen Therapy  SpO2: 95 %  O2 Device: None (Room air)  Social/Functional History  Social/Functional History  Lives With: Alone  Type of Home: Trailer  Home Layout: One level  Home Access: Stairs to enter with rails  Entrance Stairs - Number of Steps: 5  Entrance Stairs - Rails: Both  Bathroom Shower/Tub: Tub/Shower unit  Bathroom Toilet: Handicap height  Bathroom Equipment: Shower chair  Bathroom Accessibility: Accessible  ADL Assistance: Independent  Homemaking Assistance: Independent  Ambulation Assistance: Independent  Transfer Assistance: Independent  Active : Yes       Objective        Orientation  Overall Orientation Status: Within Normal Limits     Functional Mobility  Assist Level: Independent (to modified independent)  Functional Mobility Comments: with light ambulatory ADL,  no losses of balance  Toilet Transfers  Toilet Transfer: Independent; Modified independent  Shower Transfers  Shower Transfers: Independent; Modified independence  ADL  Feeding: Independent  Grooming: Independent  UE Bathing: Independent  LE Bathing: Modified independent ; Independent  UE Dressing: Independent  LE Dressing: Independent  Toileting: Independent; Modified independent   Tone RUE  RUE Tone: Normotonic  Tone LUE  LUE Tone: Normotonic  Quality of Movement Other  Comment: no FM deficit noted        Transfers  Stand Step Transfers: Independent; Modified independent     Cognition  Overall Cognitive Status: WFL                 LUE PROM (degrees)  LUE PROM: WNL  LUE AROM (degrees)  LUE AROM : WNL  RUE PROM (degrees)  RUE PROM: WNL  RUE AROM (degrees)  RUE AROM : WNL  LUE Strength  Gross LUE Strength: WFL  RUE Strength  Gross RUE Strength: WFL                 Tx initiated: Therapeutic exercises to prevent deconditioning.  (15 mins)    Plan   Plan  Plan Comment: No further visits needed.     G-Code     OutComes Score                                                  AM-PAC Score             Goals  Short term goals  Short term goal 1: Patient will be independent with therapeutic activity recommendations (met)       Therapy Time   Individual Concurrent Group Co-treatment   Time In           Time Out           Minutes                   Jr Pa OT Electronically signed by Jr Pa OT on 8/2/2021 at 3:01 PM

## 2021-08-03 VITALS
OXYGEN SATURATION: 96 % | SYSTOLIC BLOOD PRESSURE: 99 MMHG | TEMPERATURE: 97.9 F | BODY MASS INDEX: 24.24 KG/M2 | DIASTOLIC BLOOD PRESSURE: 61 MMHG | HEIGHT: 64 IN | RESPIRATION RATE: 16 BRPM | WEIGHT: 142 LBS | HEART RATE: 91 BPM

## 2021-08-03 LAB
ANION GAP SERPL CALCULATED.3IONS-SCNC: 14 MMOL/L (ref 7–19)
BUN BLDV-MCNC: 13 MG/DL (ref 8–23)
CALCIUM SERPL-MCNC: 7.7 MG/DL (ref 8.8–10.2)
CHLORIDE BLD-SCNC: 108 MMOL/L (ref 98–111)
CO2: 18 MMOL/L (ref 22–29)
CREAT SERPL-MCNC: 1 MG/DL (ref 0.5–0.9)
GFR AFRICAN AMERICAN: >59
GFR NON-AFRICAN AMERICAN: 56
GLUCOSE BLD-MCNC: 86 MG/DL (ref 74–109)
HCT VFR BLD CALC: 28.4 % (ref 37–47)
HEMOGLOBIN: 9 G/DL (ref 12–16)
MCH RBC QN AUTO: 30 PG (ref 27–31)
MCHC RBC AUTO-ENTMCNC: 31.7 G/DL (ref 33–37)
MCV RBC AUTO: 94.7 FL (ref 81–99)
PDW BLD-RTO: 14.8 % (ref 11.5–14.5)
PLATELET # BLD: 184 K/UL (ref 130–400)
PMV BLD AUTO: 11.9 FL (ref 9.4–12.3)
POTASSIUM REFLEX MAGNESIUM: 4 MMOL/L (ref 3.5–5)
RBC # BLD: 3 M/UL (ref 4.2–5.4)
SODIUM BLD-SCNC: 140 MMOL/L (ref 136–145)
WBC # BLD: 12.8 K/UL (ref 4.8–10.8)

## 2021-08-03 PROCEDURE — 80048 BASIC METABOLIC PNL TOTAL CA: CPT

## 2021-08-03 PROCEDURE — 6370000000 HC RX 637 (ALT 250 FOR IP): Performed by: HOSPITALIST

## 2021-08-03 PROCEDURE — 2500000003 HC RX 250 WO HCPCS: Performed by: HOSPITALIST

## 2021-08-03 PROCEDURE — 36415 COLL VENOUS BLD VENIPUNCTURE: CPT

## 2021-08-03 PROCEDURE — 2580000003 HC RX 258: Performed by: HOSPITALIST

## 2021-08-03 PROCEDURE — 85027 COMPLETE CBC AUTOMATED: CPT

## 2021-08-03 PROCEDURE — 6360000002 HC RX W HCPCS: Performed by: HOSPITALIST

## 2021-08-03 RX ORDER — NITROFURANTOIN 25; 75 MG/1; MG/1
100 CAPSULE ORAL 2 TIMES DAILY
Qty: 6 CAPSULE | Refills: 0 | Status: SHIPPED | OUTPATIENT
Start: 2021-08-03 | End: 2021-08-06

## 2021-08-03 RX ADMIN — ACETAMINOPHEN 650 MG: 325 TABLET ORAL at 02:01

## 2021-08-03 RX ADMIN — Medication 5000 UNITS: at 10:35

## 2021-08-03 RX ADMIN — LEVOTHYROXINE SODIUM 50 MCG: 50 TABLET ORAL at 05:55

## 2021-08-03 RX ADMIN — PANTOPRAZOLE SODIUM 40 MG: 40 TABLET, DELAYED RELEASE ORAL at 05:55

## 2021-08-03 RX ADMIN — BUPROPION HYDROCHLORIDE 200 MG: 100 TABLET, FILM COATED, EXTENDED RELEASE ORAL at 10:34

## 2021-08-03 RX ADMIN — POTASSIUM CHLORIDE: 2 INJECTION, SOLUTION, CONCENTRATE INTRAVENOUS at 04:22

## 2021-08-03 RX ADMIN — SERTRALINE HYDROCHLORIDE 200 MG: 100 TABLET ORAL at 10:34

## 2021-08-03 RX ADMIN — MEROPENEM 1000 MG: 1 INJECTION, POWDER, FOR SOLUTION INTRAVENOUS at 12:40

## 2021-08-03 RX ADMIN — TOPIRAMATE 50 MG: 50 TABLET, FILM COATED ORAL at 10:34

## 2021-08-03 RX ADMIN — ATORVASTATIN CALCIUM 40 MG: 40 TABLET, FILM COATED ORAL at 10:34

## 2021-08-03 ASSESSMENT — PAIN SCALES - GENERAL
PAINLEVEL_OUTOF10: 1
PAINLEVEL_OUTOF10: 4

## 2021-08-03 ASSESSMENT — PAIN DESCRIPTION - FREQUENCY: FREQUENCY: INTERMITTENT

## 2021-08-03 ASSESSMENT — PAIN DESCRIPTION - ORIENTATION: ORIENTATION: ANTERIOR

## 2021-08-03 ASSESSMENT — PAIN DESCRIPTION - PAIN TYPE: TYPE: ACUTE PAIN

## 2021-08-03 ASSESSMENT — PAIN DESCRIPTION - LOCATION: LOCATION: HEAD

## 2021-08-03 ASSESSMENT — PAIN DESCRIPTION - PROGRESSION: CLINICAL_PROGRESSION: NOT CHANGED

## 2021-08-03 ASSESSMENT — PAIN DESCRIPTION - ONSET: ONSET: ON-GOING

## 2021-08-03 ASSESSMENT — PAIN DESCRIPTION - DESCRIPTORS: DESCRIPTORS: HEADACHE;DISCOMFORT

## 2021-08-03 ASSESSMENT — PAIN - FUNCTIONAL ASSESSMENT: PAIN_FUNCTIONAL_ASSESSMENT: ACTIVITIES ARE NOT PREVENTED

## 2021-08-03 NOTE — DISCHARGE SUMMARY
Discharge Summary      Date:8/3/2021        Patient Uzma Matamoros     YOB: 1960     Age:61 y.o. Admit Date:7/31/2021   Admission Condition:fair   Discharged Condition:stable  Discharge Date: 08/03/21       Discharge Diagnoses   Active Problems:    AMS (altered mental status)  Resolved Problems:    * No resolved hospital problems. [facility] Stay   Narrative of Hospital Course:     56-year-old lady with a history of bipolar 1 disorder, depression, hyperlipidemia, presents to the hospital with concerns of generalized weakness and fever.  In the ED blood pressure noted to be in the mid 100, lab elevated BUN of 29 creatinine of 1.5, white blood cell normal at 9.4, urinalysis grossly positive for urinary tract infection was given ceftriaxone 1 L of fluid and admitted for further evaluation. Urine culture growing ESBL, antibiotics transitioned to meropenem. Patient completed 2 days of IV medication in-house, ESBL is sensitive to nitrofurantoin. Patient was transitioned to oral antibiotics to complete 3 more days outpatient for total of 5 days. JEFF improved, metabolic acidosis also with some improvement. Patient noted to have couple sedating medication with concerns of polypharmacy including gabapentin, Norco, benzo, and Seroquel. Patient requesting discharge home, was encouraged to follow-up outpatient with PCP for continuous management of chronic medical problems as well as reevaluation of baseline chronic medication as high concerns for polypharmacy. Physical Exam  General: Alert, well-developed, no acute distress lying comfortably in bed. HEENT: Atraumatic normocephalic, range of motion normal  Cardiac: Normal S1-S2 no murmurs rub or gallop. Respiratory: Effort normal, breath sounds normal, clear To auscultation bilaterally  Abdomen: Soft, positive bowel sounds in all quadrants, no distention, nontender to palpation.   MSK/extremities: no tenderness, no edema, no deformity, moves all extremities  Skin: Warm, no erythema noted, no bruising and no lesions noted. Psych: Affect flat and good eye contact, behavioral normal  Neurological: No focal deficits, alert and conversant, no formal disorientation noted.  No sensory deficits, no abnormal coordination. Consultants:   None    Time Spent on Discharge:  40 minutes were spent in patient examination, evaluation, counseling as well as medication reconciliation, prescriptions for required medications, discharge plan and follow up. Surgeries/Procedures Performed:  NONE      Significant Diagnostic Studies:   Recent Labs:  CBC:   Lab Results   Component Value Date    WBC 12.8 08/03/2021    RBC 3.00 08/03/2021    HGB 9.0 08/03/2021    HCT 28.4 08/03/2021    MCV 94.7 08/03/2021    MCH 30.0 08/03/2021    MCHC 31.7 08/03/2021    RDW 14.8 08/03/2021     08/03/2021     BMP:    Lab Results   Component Value Date    GLUCOSE 86 08/03/2021     08/03/2021    K 4.0 08/03/2021     08/03/2021    CO2 18 08/03/2021    ANIONGAP 14 08/03/2021    BUN 13 08/03/2021    CREATININE 1.0 08/03/2021    CALCIUM 7.7 08/03/2021    LABGLOM 56 08/03/2021    GFRAA >59 08/03/2021       Radiology Last 7 Days:  XR CHEST PORTABLE    Result Date: 7/31/2021  1. No acute cardiopulmonary finding.  Signed by Dr Hector Mead      Discharge Plan   Disposition: Home    Provider Follow-Up:   Dr. Talamantes [facility], [address] [place] [address]  [phone]  Go on 8/12/2021  @11:00AM         Patient Instructions   Diet: regular diet    Activity: activity as tolerated      Discharge Medications         Medication List      START taking these medications    nitrofurantoin (macrocrystal-monohydrate) 100 MG capsule  Commonly known as: MACROBID  Take 1 capsule by mouth 2 times daily for 3 days     vitamin D 125 MCG (5000 UT) Caps capsule  Commonly known as: CHOLECALCIFEROL  Take 1 capsule by mouth daily  Start taking on: August 4, 2021        CONTINUE taking these medications    atorvastatin 40 MG tablet  Commonly known as: LIPITOR     buPROPion 200 MG extended release tablet  Commonly known as: WELLBUTRIN SR     celecoxib 200 MG capsule  Commonly known as: CELEBREX     clonazePAM 1 MG tablet  Commonly known as: KLONOPIN     cyanocobalamin 1000 MCG/ML injection     esomeprazole Magnesium 40 MG Pack  Commonly known as: NEXIUM     gabapentin 300 MG capsule  Commonly known as: NEURONTIN     HYDROcodone-acetaminophen  MG per tablet  Commonly known as: NORCO     levothyroxine 100 MCG tablet  Commonly known as: SYNTHROID     ondansetron 8 MG Tbdp disintegrating tablet  Commonly known as: ZOFRAN-ODT     QUEtiapine 200 MG tablet  Commonly known as: SEROQUEL     rizatriptan 10 MG tablet  Commonly known as: MAXALT     sertraline 100 MG tablet  Commonly known as: ZOLOFT     topiramate 25 MG tablet  Commonly known as: TOPAMAX           Where to Get Your Medications      These medications were sent to [facility] [address] [facility] [place] [address]    Phone: [phone]   · nitrofurantoin (macrocrystal-monohydrate) 100 MG capsule  · vitamin D 125 MCG (5000 UT) Caps capsule         Electronically signed by Blaine Hunter MD on 8/3/21 at 1:19 PM CDT

## 2021-08-03 NOTE — PROGRESS NOTES
Physician Progress Note      PATIENTJeezekiel Weber  CSN #:                  [id number]  :                       1960  ADMIT DATE:       2021 10:44 AM  DISCH DATE:  RESPONDING  PROVIDER #:        Melchor Riedel MD          QUERY TEXT:    Pt admitted with UTI. Pt noted to have elevated WBC, elevated HR and Elevated   Temp. . If possible, please document in the progress notes and discharge   summary if you are evaluating and /or treating any of the following: The medical record reflects the following:  Risk Factors: UTI  Clinical Indicators: Upon arrival in the ER: pt. had T: of 101.7, HR: 103,   with WBC at 9.4 with 4 bands, WBC has jared to 12.8, mild hypotension in the   ER: 83/51 and 89/57, noted AMS on arrival and decreased SPO2, no lactic, no   procalcitonin, no CRP  Treatment: Rocephin 1 gram and transitioned to meropenem, Fluids 125cc/hr,   Tylenol for fever, urine culture growing ESBL    Thank you in advance,    Maxx Nesbitt, RN-BSN, [facility]  Clinical   [facility]  [email or web address]  Options provided:  -- ESBL Sepsis, present on admission  -- ESBL Sepsis, not present on admission  -- UTI without Sepsis  -- Sepsis was ruled out  -- SIRS only  -- Other - I will add my own diagnosis  -- Disagree - Not applicable / Not valid  -- Disagree - Clinically unable to determine / Unknown  -- Refer to Clinical Documentation Reviewer    PROVIDER RESPONSE TEXT:    This patient has UTI without Sepsis.     Query created by: Valentino Faster on 8/3/2021 1:44 PM      Electronically signed by:  Melchor Riedel MD 8/3/2021 2:20 PM

## 2021-08-10 LAB
EKG P AXIS: 62 DEGREES
EKG P-R INTERVAL: 152 MS
EKG Q-T INTERVAL: 416 MS
EKG QRS DURATION: 94 MS
EKG QTC CALCULATION (BAZETT): 442 MS
EKG T AXIS: 26 DEGREES

## 2022-04-13 PROCEDURE — 87186 SC STD MICRODIL/AGAR DIL: CPT | Performed by: FAMILY MEDICINE

## 2022-04-13 PROCEDURE — U0005 INFEC AGEN DETEC AMPLI PROBE: HCPCS | Performed by: FAMILY MEDICINE

## 2022-04-13 PROCEDURE — 87077 CULTURE AEROBIC IDENTIFY: CPT | Performed by: FAMILY MEDICINE

## 2022-04-13 PROCEDURE — U0004 COV-19 TEST NON-CDC HGH THRU: HCPCS | Performed by: FAMILY MEDICINE

## 2022-04-13 PROCEDURE — 87086 URINE CULTURE/COLONY COUNT: CPT | Performed by: FAMILY MEDICINE

## 2022-05-10 ENCOUNTER — OFFICE VISIT (OUTPATIENT)
Dept: NEUROLOGY | Facility: CLINIC | Age: 62
End: 2022-05-10

## 2022-05-10 VITALS
SYSTOLIC BLOOD PRESSURE: 112 MMHG | WEIGHT: 141.2 LBS | BODY MASS INDEX: 25.98 KG/M2 | HEIGHT: 62 IN | OXYGEN SATURATION: 96 % | HEART RATE: 89 BPM | DIASTOLIC BLOOD PRESSURE: 68 MMHG

## 2022-05-10 DIAGNOSIS — G43.719 INTRACTABLE CHRONIC MIGRAINE WITHOUT AURA AND WITHOUT STATUS MIGRAINOSUS: Primary | ICD-10-CM

## 2022-05-10 PROCEDURE — 99204 OFFICE O/P NEW MOD 45 MIN: CPT | Performed by: PSYCHIATRY & NEUROLOGY

## 2022-05-10 RX ORDER — TOPIRAMATE 50 MG/1
50 TABLET, FILM COATED ORAL 2 TIMES DAILY
Qty: 60 TABLET | Refills: 2 | Status: SHIPPED | OUTPATIENT
Start: 2022-05-10 | End: 2022-08-08

## 2022-05-10 RX ORDER — RIMEGEPANT SULFATE 75 MG/75MG
75 TABLET, ORALLY DISINTEGRATING ORAL ONCE AS NEEDED
Qty: 16 TABLET | Refills: 5 | Status: SHIPPED | OUTPATIENT
Start: 2022-05-10

## 2022-05-10 NOTE — PROGRESS NOTES
Chief Complaint    Subjective        Alyssa Dillon presents to [facility]    Patient is a 61-year-old female who presents for evaluation of migraine.  She has had migraines for about 6 years.  She currently has about 6 to 8/month but including tension headaches has about 10 headache days per month.  Migraines are mostly on the left side of her head.  She gets nausea and photophobia.  She has tried rizatriptan and sumatriptan which do not help.  Migraines can last from 1 day up to 4.  She has been using Topamax 50 mg daily for about a year and has not noticed much of a difference.        Past Medical History:   Diagnosis Date   • Bipolar 2 disorder (HCC)    • Headache, tension-type Several years   • Hyperlipidemia Several years   • Memory loss About 5 years +   • Migraine    • Osteoarthritis    • Osteoporosis    • Vision loss Wear glasses since age 4          Current Outpatient Medications:   •  atorvastatin (LIPITOR) 40 MG tablet, Take 1 tablet by mouth Daily., Disp: , Rfl:   •  calcium carbonate-vitamin d 600-400 MG-UNIT per tablet, Take 1 tablet by mouth 2 (Two) Times a Day., Disp: , Rfl:   •  clonazePAM (KlonoPIN) 1 MG tablet, Take 1 tablet by mouth 3 (Three) Times a Day., Disp: , Rfl:   •  cyclobenzaprine (FLEXERIL) 10 MG tablet, Take 1 tablet by mouth Every Night., Disp: , Rfl:   •  esomeprazole (nexIUM) 40 MG capsule, Take 40 mg by mouth 2 (Two) Times a Day., Disp: , Rfl:   •  gabapentin (NEURONTIN) 300 MG capsule, Take 2 capsules by mouth Every Night., Disp: , Rfl:   •  HYDROcodone-acetaminophen (NORCO)  MG per tablet, Take 1 tablet by mouth Every 8 (Eight) Hours As Needed., Disp: , Rfl:   •  levothyroxine (SYNTHROID, LEVOTHROID) 50 MCG tablet, Take 1 tablet by mouth Daily., Disp: , Rfl:   •  ondansetron (ZOFRAN) 8 MG tablet, Take 1 tablet by mouth As Needed., Disp: , Rfl:   •  QUEtiapine (SEROquel) 100 MG tablet, Take 2 tablets by mouth Every Night., Disp: , Rfl:   •   "rizatriptan (MAXALT) 10 MG tablet, Take 10 mg by mouth As Needed., Disp: , Rfl:   •  sertraline (ZOLOFT) 100 MG tablet, Take 2 tablets by mouth Daily., Disp: , Rfl:   •  SUMAtriptan (IMITREX) 50 MG tablet, Take 50 mg by mouth As Needed., Disp: , Rfl:   •  topiramate (TOPAMAX) 25 MG tablet, Take 50 mg by mouth Daily., Disp: , Rfl:        Objective   Vital Signs:   /68 (BP Location: Left arm, Patient Position: Sitting, Cuff Size: Adult)   Pulse 89   Ht 157.5 cm (62\")   Wt 64 kg (141 lb 3.2 oz)   SpO2 96%   BMI 25.83 kg/m²     Physical Exam  Constitutional:       General: She is awake.   Eyes:      Extraocular Movements: Extraocular movements intact.      Pupils: Pupils are equal, round, and reactive to light.   Neurological:      Mental Status: She is alert.      Deep Tendon Reflexes: Strength normal and reflexes are normal and symmetric.   Psychiatric:         Speech: Speech normal.        Neurological Exam  Mental Status  Awake and alert. Oriented to person, place and time. Recent and remote memory are intact. Speech is normal. Language is fluent with no aphasia. Attention and concentration are normal. Fund of knowledge is appropriate for level of education.    Cranial Nerves  CN II: Visual fields full to confrontation.  CN III, IV, VI: Extraocular movements intact bilaterally. Pupils equal round and reactive to light bilaterally.  CN V: Facial sensation is normal.  CN VII: Full and symmetric facial movement.  CN IX, X: Palate elevates symmetrically  CN XI: Shoulder shrug strength is normal.  CN XII: Tongue midline without atrophy or fasciculations.    Motor  Normal muscle bulk throughout. Normal muscle tone. No abnormal involuntary movements. Strength is 5/5 throughout all four extremities.    Sensory  Light touch is normal in upper and lower extremities.     Reflexes  Deep tendon reflexes are 2+ and symmetric in all four extremities.    Coordination  Right: Finger-to-nose normal.Left: Finger-to-nose " normal.    Gait  Casual gait is normal including stance, stride, and arm swing.      Result Review :                     Assessment and Plan   61-year-old female with migraines.  She is currently only on 50 mg daily of Topamax.  I would like to try her on 50 mg twice daily to see if that will help before changing her preventative.  She is on sertraline as well as gabapentin.  Would not use propranolol because she has depression.  She has failed to triptans so we will try Nurtec.      Plan:    1.  Increase Topamax to 50 mg twice daily.  2.  Try Nurtec 75 mg at onset of migraine.  3.  Follow-up 2 months.          Follow Up   No follow-ups on file.  Patient was given instructions and counseling regarding her condition or for health maintenance advice. Please see specific information pulled into the AVS if appropriate.        Not applicable as gestational age is greater than or equal to 34 weeks.

## 2022-05-11 ENCOUNTER — PATIENT ROUNDING (BHMG ONLY) (OUTPATIENT)
Dept: NEUROLOGY | Facility: CLINIC | Age: 62
End: 2022-05-11

## 2022-05-11 NOTE — PROGRESS NOTES
May 11, 2022    gillian Marrero I speak with Alyssa Dillon?    My name is Alma Delia Anderson MA    I am  with [facility]  [address]  [address]  [facility] [address] [phone].    Before we get started may I verify your date of birth? 1960    I am calling to officially welcome you to our practice and ask about your recent visit. Is this a good time to talk?     Tell me about your visit with us. What things went well?        We're always looking for ways to make our patients' experiences even better. Do you have recommendations on ways we may improve?      Overall were you satisfied with your first visit to our practice?      I appreciate you taking the time to speak with me today. Is there anything else I can do for you?     Thank you, and have a great day.    UNABLE TO REACH THE PATIENT, LEFT A VM REQUESTING A CALL BACK.

## 2022-05-12 ENCOUNTER — TELEPHONE (OUTPATIENT)
Dept: PSYCHIATRY | Age: 62
End: 2022-05-12

## 2022-05-12 NOTE — TELEPHONE ENCOUNTER
Called pt to schedule an appt from a referral    No answer and LVM.     Electronically signed by Jacek Nuno MA on 5/12/2022 at 4:14 PM

## 2022-05-16 ENCOUNTER — TELEPHONE (OUTPATIENT)
Dept: PSYCHIATRY | Age: 62
End: 2022-05-16

## 2022-05-16 NOTE — TELEPHONE ENCOUNTER
Pt called back to schedule an appt from a referral     Scheduled with Dr. Janie Dumas on 7/20 @ 12. Notified the referring office as well.     Electronically signed by Haroon Whittaker MA on 5/16/2022 at 1:28 PM

## 2022-07-19 ENCOUNTER — TELEPHONE (OUTPATIENT)
Dept: PSYCHIATRY | Age: 62
End: 2022-07-19

## 2022-07-19 NOTE — TELEPHONE ENCOUNTER
Called and lvm reminding pt of her appt for [email or web address]    Electronically signed by Gilda So on 7/19/2022 at 10:57 AM

## 2022-07-19 NOTE — TELEPHONE ENCOUNTER
Pt called back to confirm her appt for [email or web address]    Electronically signed by Aure Tierney on 7/19/2022 at 11:01 AM

## 2022-07-20 ENCOUNTER — OFFICE VISIT (OUTPATIENT)
Dept: PSYCHIATRY | Age: 62
End: 2022-07-20
Payer: MEDICARE

## 2022-07-20 ENCOUNTER — TELEPHONE (OUTPATIENT)
Dept: PSYCHIATRY | Age: 62
End: 2022-07-20

## 2022-07-20 VITALS
HEIGHT: 62 IN | DIASTOLIC BLOOD PRESSURE: 72 MMHG | BODY MASS INDEX: 25.8 KG/M2 | HEART RATE: 105 BPM | WEIGHT: 140.2 LBS | SYSTOLIC BLOOD PRESSURE: 113 MMHG | TEMPERATURE: 98.8 F | OXYGEN SATURATION: 96 %

## 2022-07-20 DIAGNOSIS — F31.9 BIPOLAR DEPRESSION (HCC): Primary | ICD-10-CM

## 2022-07-20 DIAGNOSIS — F41.1 GENERALIZED ANXIETY DISORDER: ICD-10-CM

## 2022-07-20 DIAGNOSIS — G47.00 INSOMNIA, UNSPECIFIED TYPE: ICD-10-CM

## 2022-07-20 PROCEDURE — 3017F COLORECTAL CA SCREEN DOC REV: CPT | Performed by: PSYCHIATRY & NEUROLOGY

## 2022-07-20 PROCEDURE — 99204 OFFICE O/P NEW MOD 45 MIN: CPT | Performed by: PSYCHIATRY & NEUROLOGY

## 2022-07-20 PROCEDURE — G8419 CALC BMI OUT NRM PARAM NOF/U: HCPCS | Performed by: PSYCHIATRY & NEUROLOGY

## 2022-07-20 PROCEDURE — G8428 CUR MEDS NOT DOCUMENT: HCPCS | Performed by: PSYCHIATRY & NEUROLOGY

## 2022-07-20 PROCEDURE — 1036F TOBACCO NON-USER: CPT | Performed by: PSYCHIATRY & NEUROLOGY

## 2022-07-20 ASSESSMENT — COLUMBIA-SUICIDE SEVERITY RATING SCALE - C-SSRS
1. WITHIN THE PAST MONTH, HAVE YOU WISHED YOU WERE DEAD OR WISHED YOU COULD GO TO SLEEP AND NOT WAKE UP?: NO
2. HAVE YOU ACTUALLY HAD ANY THOUGHTS OF KILLING YOURSELF?: NO
6. HAVE YOU EVER DONE ANYTHING, STARTED TO DO ANYTHING, OR PREPARED TO DO ANYTHING TO END YOUR LIFE?: NO

## 2022-07-20 ASSESSMENT — PATIENT HEALTH QUESTIONNAIRE - PHQ9
1. LITTLE INTEREST OR PLEASURE IN DOING THINGS: 2
7. TROUBLE CONCENTRATING ON THINGS, SUCH AS READING THE NEWSPAPER OR WATCHING TELEVISION: 2
10. IF YOU CHECKED OFF ANY PROBLEMS, HOW DIFFICULT HAVE THESE PROBLEMS MADE IT FOR YOU TO DO YOUR WORK, TAKE CARE OF THINGS AT HOME, OR GET ALONG WITH OTHER PEOPLE: 1
SUM OF ALL RESPONSES TO PHQ QUESTIONS 1-9: 16
SUM OF ALL RESPONSES TO PHQ QUESTIONS 1-9: 15
SUM OF ALL RESPONSES TO PHQ9 QUESTIONS 1 & 2: 5
3. TROUBLE FALLING OR STAYING ASLEEP: 0
1. LITTLE INTEREST OR PLEASURE IN DOING THINGS: 3
9. THOUGHTS THAT YOU WOULD BE BETTER OFF DEAD, OR OF HURTING YOURSELF: 0
3. TROUBLE FALLING OR STAYING ASLEEP: 1
SUM OF ALL RESPONSES TO PHQ9 QUESTIONS 1 & 2: 4
5. POOR APPETITE OR OVEREATING: 2
SUM OF ALL RESPONSES TO PHQ QUESTIONS 1-9: 15
SUM OF ALL RESPONSES TO PHQ QUESTIONS 1-9: 15
8. MOVING OR SPEAKING SO SLOWLY THAT OTHER PEOPLE COULD HAVE NOTICED. OR THE OPPOSITE, BEING SO FIGETY OR RESTLESS THAT YOU HAVE BEEN MOVING AROUND A LOT MORE THAN USUAL: 1
SUM OF ALL RESPONSES TO PHQ QUESTIONS 1-9: 16
6. FEELING BAD ABOUT YOURSELF - OR THAT YOU ARE A FAILURE OR HAVE LET YOURSELF OR YOUR FAMILY DOWN: 1
SUM OF ALL RESPONSES TO PHQ QUESTIONS 1-9: 15
7. TROUBLE CONCENTRATING ON THINGS, SUCH AS READING THE NEWSPAPER OR WATCHING TELEVISION: 2
SUM OF ALL RESPONSES TO PHQ QUESTIONS 1-9: 16
2. FEELING DOWN, DEPRESSED OR HOPELESS: 2
5. POOR APPETITE OR OVEREATING: 2
4. FEELING TIRED OR HAVING LITTLE ENERGY: 3
SUM OF ALL RESPONSES TO PHQ QUESTIONS 1-9: 16
10. IF YOU CHECKED OFF ANY PROBLEMS, HOW DIFFICULT HAVE THESE PROBLEMS MADE IT FOR YOU TO DO YOUR WORK, TAKE CARE OF THINGS AT HOME, OR GET ALONG WITH OTHER PEOPLE: 2
6. FEELING BAD ABOUT YOURSELF - OR THAT YOU ARE A FAILURE OR HAVE LET YOURSELF OR YOUR FAMILY DOWN: 2
8. MOVING OR SPEAKING SO SLOWLY THAT OTHER PEOPLE COULD HAVE NOTICED. OR THE OPPOSITE, BEING SO FIGETY OR RESTLESS THAT YOU HAVE BEEN MOVING AROUND A LOT MORE THAN USUAL: 3
9. THOUGHTS THAT YOU WOULD BE BETTER OFF DEAD, OR OF HURTING YOURSELF: 0
2. FEELING DOWN, DEPRESSED OR HOPELESS: 2
4. FEELING TIRED OR HAVING LITTLE ENERGY: 3
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
## 2022-07-20 NOTE — TELEPHONE ENCOUNTER
PA was approved for Vraylar 1.5MG       Called pharmacy and pt to let them know that a PA was approved    Electronically signed by Samia Gimenez on 7/20/2022 at 2:31 PM

## 2022-07-26 ENCOUNTER — TELEPHONE (OUTPATIENT)
Dept: PSYCHIATRY | Age: 62
End: 2022-07-26

## 2022-07-29 ENCOUNTER — TELEPHONE (OUTPATIENT)
Dept: PSYCHIATRY | Age: 62
End: 2022-07-29

## 2022-07-29 NOTE — TELEPHONE ENCOUNTER
Attempted to contact pt as requested by Dr Scanlon Link to get an update on how she is doing-left VM with request for call back.

## 2022-07-29 NOTE — TELEPHONE ENCOUNTER
Attempted to contact pt x 3 today to get an update on how she is doing as requested per Dr Rochelle Matute. Call goes directly to -left  x 2 with request for call back. Message sent to inform Dr Rochelle Matute.

## 2022-08-01 ENCOUNTER — TELEPHONE (OUTPATIENT)
Dept: PSYCHIATRY | Age: 62
End: 2022-08-01

## 2022-08-01 NOTE — TELEPHONE ENCOUNTER
Pt returned VM. She stated that she was doing \"pretty good\". She stated that she cannot tell yet if the med is going to help. She stated that she is taking Klonopin 1 in the am and 1/2 in the afternoon and that her anxiety \"was ok\". She stated she has completed the taper of Zoloft and is taking Vraylar. She stated that she was having migraines and is seeing a neurologist at [facility] with next appt next Monday. She stated that she could not take the med she prescribed due to not covered per insurance. Pt denied any concerns that she felt needed to be shared with Dr Opal Morrison at this time. Pt was encouraged to call back with any questions or concerns.

## 2022-08-01 NOTE — TELEPHONE ENCOUNTER
Attempted to contact pt as requested by Dr Opal Morrison to get an update on how she was doing-call went straight to -left  with the above info and request for call back.

## 2022-09-13 ENCOUNTER — TELEPHONE (OUTPATIENT)
Dept: PSYCHIATRY | Age: 62
End: 2022-09-13

## 2022-09-13 ENCOUNTER — TELEPHONE (OUTPATIENT)
Dept: NEUROLOGY | Facility: CLINIC | Age: 62
End: 2022-09-13

## 2022-09-13 NOTE — TELEPHONE ENCOUNTER
Caller: Alyssa Dillon    Relationship: Self    Best call back number: [phone]    What was the call regarding: THIS ENCOUNTER HAS BEEN CREATED, PER [facility] PROTOCOL, TO MAKE THE OFFICE AWARE THAT PT CALLED TO CANCEL HER F/U APPT W/ DR. DIAL TODAY, 9/13/22 AS SHE HAD A FEVER AND HAS BEEN EXPOSED TO COVID-19. PT STATES SHE WILL CALL BACK WHEN SHE IS READY TO SCHEDULE.    Do you require a callback: NO    DOCUMENTING PER [facility] PROT[employer]OL AS PT CANCELLED F/U APPT W/ OPT TO NOT RESCHEDULE AT THE TIME OF CALL.

## 2022-09-13 NOTE — TELEPHONE ENCOUNTER
Called and lvm reminding pt of her appt for [email or web address]    Electronically signed by Rodrigo Raza on 9/13/2022 at 11:53 AM

## 2022-09-14 ENCOUNTER — TELEPHONE (OUTPATIENT)
Dept: PSYCHIATRY | Age: 62
End: 2022-09-14

## 2022-10-04 ENCOUNTER — TELEPHONE (OUTPATIENT)
Dept: PSYCHIATRY | Age: 62
End: 2022-10-04

## 2022-10-04 NOTE — TELEPHONE ENCOUNTER
Called and confirmed appt with pt for [email or web address]    Electronically signed by Paolo Wagner on 10/4/2022 at 11:34 AM

## 2022-10-05 ENCOUNTER — OFFICE VISIT (OUTPATIENT)
Dept: PSYCHIATRY | Age: 62
End: 2022-10-05
Payer: MEDICARE

## 2022-10-05 VITALS
HEART RATE: 92 BPM | OXYGEN SATURATION: 98 % | BODY MASS INDEX: 21.99 KG/M2 | SYSTOLIC BLOOD PRESSURE: 110 MMHG | DIASTOLIC BLOOD PRESSURE: 68 MMHG | WEIGHT: 132 LBS | TEMPERATURE: 97.2 F | HEIGHT: 65 IN

## 2022-10-05 DIAGNOSIS — F31.9 BIPOLAR DEPRESSION (HCC): Primary | ICD-10-CM

## 2022-10-05 DIAGNOSIS — G47.00 INSOMNIA, UNSPECIFIED TYPE: ICD-10-CM

## 2022-10-05 DIAGNOSIS — F41.1 GENERALIZED ANXIETY DISORDER: ICD-10-CM

## 2022-10-05 PROCEDURE — G8420 CALC BMI NORM PARAMETERS: HCPCS | Performed by: PSYCHIATRY & NEUROLOGY

## 2022-10-05 PROCEDURE — G8428 CUR MEDS NOT DOCUMENT: HCPCS | Performed by: PSYCHIATRY & NEUROLOGY

## 2022-10-05 PROCEDURE — 3017F COLORECTAL CA SCREEN DOC REV: CPT | Performed by: PSYCHIATRY & NEUROLOGY

## 2022-10-05 PROCEDURE — G8484 FLU IMMUNIZE NO ADMIN: HCPCS | Performed by: PSYCHIATRY & NEUROLOGY

## 2022-10-05 PROCEDURE — 99214 OFFICE O/P EST MOD 30 MIN: CPT | Performed by: PSYCHIATRY & NEUROLOGY

## 2022-10-05 PROCEDURE — 1036F TOBACCO NON-USER: CPT | Performed by: PSYCHIATRY & NEUROLOGY

## 2022-10-05 RX ORDER — GABAPENTIN 300 MG/1
300 CAPSULE ORAL DAILY
Qty: 30 CAPSULE | Refills: 1 | Status: SHIPPED | OUTPATIENT
Start: 2022-10-05 | End: 2022-11-04

## 2022-10-05 ASSESSMENT — PATIENT HEALTH QUESTIONNAIRE - PHQ9
4. FEELING TIRED OR HAVING LITTLE ENERGY: 1
1. LITTLE INTEREST OR PLEASURE IN DOING THINGS: 1
10. IF YOU CHECKED OFF ANY PROBLEMS, HOW DIFFICULT HAVE THESE PROBLEMS MADE IT FOR YOU TO DO YOUR WORK, TAKE CARE OF THINGS AT HOME, OR GET ALONG WITH OTHER PEOPLE: 1
SUM OF ALL RESPONSES TO PHQ QUESTIONS 1-9: 5
7. TROUBLE CONCENTRATING ON THINGS, SUCH AS READING THE NEWSPAPER OR WATCHING TELEVISION: 1
SUM OF ALL RESPONSES TO PHQ QUESTIONS 1-9: 5
8. MOVING OR SPEAKING SO SLOWLY THAT OTHER PEOPLE COULD HAVE NOTICED. OR THE OPPOSITE, BEING SO FIGETY OR RESTLESS THAT YOU HAVE BEEN MOVING AROUND A LOT MORE THAN USUAL: 0
9. THOUGHTS THAT YOU WOULD BE BETTER OFF DEAD, OR OF HURTING YOURSELF: 0
2. FEELING DOWN, DEPRESSED OR HOPELESS: 1
SUM OF ALL RESPONSES TO PHQ9 QUESTIONS 1 & 2: 2
SUM OF ALL RESPONSES TO PHQ QUESTIONS 1-9: 5
SUM OF ALL RESPONSES TO PHQ QUESTIONS 1-9: 5
6. FEELING BAD ABOUT YOURSELF - OR THAT YOU ARE A FAILURE OR HAVE LET YOURSELF OR YOUR FAMILY DOWN: 1
3. TROUBLE FALLING OR STAYING ASLEEP: 0
5. POOR APPETITE OR OVEREATING: 0

## 2022-10-05 NOTE — PROGRESS NOTES
10/5/2022 3:14 PM   Progress Note          Luan Milan 1960      Chief Complaint   Patient presents with    Medication Check       Subjective:    59 yo white female with h/o bipolar, hypothyroidism and chronic pain due to osteoarthritis, s/p gastric bypass (2003), presents for follow-up. Patient was taken off Zoloft and started on Vraylar last time. On Klonopin taper. Currently takes 1 mg in the morning. She still takes Seroquel for sleep and gabapentin for chronic pain and hypnotic jerks. Patient states she stopped taking Vraylar. \"I was not feeling like myself. \"    Patient is calm and cooperative today. Affect somewhat brighter. States she is still depressed. Having a feeling of impending doom in the morning, especially if she does not take Klonopin. No suicidal thoughts. Sleeping well with Seroquel. Appetite is good. She would like to try another medicine for depression. She has heard of Latuda. Agreed to give it a try. Agreed to continue Klonopin taper. She will take 0.5 mg in the morning. We will increase gabapentin for now. She tried and did not tolerate BuSpar, hydroxyzine and Lamictal in the past.  Patient states her daughter is having a wedding celebration in November. Very excited about that. She is in physical therapy for her neck and back issues. We discussed the need to recheck her thyroid function. She will be seeing her PCP in a few weeks.         BP: /68   Pulse 92   Temp 97.2 °F (36.2 °C)   Ht 5' 5\" (1.651 m)   Wt 132 lb (59.9 kg)   SpO2 98%   BMI 21.97 kg/m²       Review of Systems - 14 point review:  Negative except for    Constitutional: (fevers, chills, night sweats, wt loss/gain, change in appetite, fatigue, somnolence)    HEENT: (ear pain or discharge, hearing loss, ear ringing, sinus pressure, nosebleed, nasal discharge, sore throat, oral sores, tooth pain, bleeding gums, hoarse voice, neck pain)      Cardiovascular: (HTN, chest pain, elevated cholesterol/lipids, palpitations, leg swelling, leg pain with walking)    Respiratory: (cough, wheezing, snoring, SOB with activity (dyspnea), SOB while lying flat (orthopnea), awakening with severe SOB (paroxysmal nocturnal dyspnea))    Gastrointestinal: (NVD, constipation, abdominal pain, bright red stools, black tarry stools, stool incontinence)     Genitourinary:  (pelvic pain, burning or frequency of urination, urinary urgency, blood in urine incomplete bladder emptying, urinary incontinence, STD; MEN: testicular pain or swelling, erectile dysfunction; WOMEN: LMP, heavy menstrual bleeding (menorrhagia), irregular periods, postmenopausal bleeding, menstrual pain (dymenorrhea, vaginal discharge)    Musculoskeletal: (bone pain/fracture, joint pain or swelling, musle pain)    Integumentary: (rashes, acne, non-healing sores, itching, breast lumps, breast pain, nipple discharge, hair loss)    Neurologic: (HA, muscle weakness, paresthesias (numbness, coldness, crawling or prickling), memory loss, seizure, dizziness)    Endocrine: (heat or cold intolerance, excessive thirst (polydipsia), excessive hunger (polyphagia))    Immune/Allergic: (hives, seasonal or environmental allergies, HIV exposure)    Hematologic/Lymphatic: (lymph node enlargement, easy bleeding or bruising)    History obtained via chart review and patient    PCP is No primary care provider on file. Current Meds:    Prior to Admission medications    Medication Sig Start Date End Date Taking? Authorizing Provider   vitamin D (CHOLECALCIFEROL) 125 MCG (5000 UT) CAPS capsule Take 1 capsule by mouth daily 8/4/21 9/3/21  Ruby Mina MD   cyanocobalamin 1000 MCG/ML injection Inject 1,000 mcg into the muscle every 30 days    Historical Provider, MD   gabapentin (NEURONTIN) 300 MG capsule Take 600 mg by mouth nightly. Historical Provider, MD   clonazePAM (KLONOPIN) 1 MG tablet Take 1 mg by mouth 3 times daily.     Historical Provider, MD   QUEtiapine (SEROQUEL) 200 MG tablet Take 200 mg by mouth nightly    Historical Provider, MD   esomeprazole Magnesium (NEXIUM) 40 MG PACK Take 40 mg by mouth 2 times daily    Historical Provider, MD   levothyroxine (SYNTHROID) 100 MCG tablet Take 50 mcg by mouth Daily    Historical Provider, MD   rizatriptan (MAXALT) 10 MG tablet Take 10 mg by mouth once as needed for Migraine (May repeat evbery 2 hrs as needed. Maximum dose 3 tablets in 24 hrs) May repeat in 2 hours if needed    Historical Provider, MD   celecoxib (CELEBREX) 200 MG capsule Take 200 mg by mouth 2 times daily    Historical Provider, MD   ondansetron (ZOFRAN-ODT) 8 MG TBDP disintegrating tablet Place 8 mg under the tongue every 8 hours as needed for Nausea or Vomiting    Historical Provider, MD   atorvastatin (LIPITOR) 40 MG tablet Take 40 mg by mouth daily    Historical Provider, MD   HYDROcodone-acetaminophen (NORCO)  MG per tablet Take 1 tablet by mouth every 8 hours as needed for Pain. Historical Provider, MD     Social History     Socioeconomic History    Marital status:    Tobacco Use    Smoking status: Never    Smokeless tobacco: Never   Substance and Sexual Activity    Alcohol use: Not Currently    Drug use: Yes     Types: Marijuana [place])     Comment: occasional       MSE:  Appearance: Appropriately groomed. Made good eye contact. Gait stable. No abnormal movements or tremor. Behavior: Calm, cooperative, and socially appropriate. No psychomotor retardation/agitation appreciated. Speech: Normal in tone, volume, and quality. No slurring, dysarthria or pressured speech noted. Mood: \"Ok\"   Affect: Mood congruent. Thought Process: Appears linear, logical and goal oriented. Causality appears intact. Thought Content: Denies active suicidal and homicidal ideations. No overt delusions or paranoia appreciated. Perceptions: Denies auditory or visual hallucinations at present time. Not responding to internal stimuli.    Concentration: Intact. Orientation: to person, place, date, and situation. Language: Intact. Fund of information: Intact. Memory: Recent and remote appear intact. Impulsivity: Limited. Neurovegitative: Fair appetite and sleep. Insight: Fair. Judgment: Fair. Lab Results   Component Value Date     08/03/2021    K 4.0 08/03/2021     08/03/2021    CO2 18 (L) 08/03/2021    BUN 13 08/03/2021    CREATININE 1.0 (H) 08/03/2021    GLUCOSE 86 08/03/2021    CALCIUM 7.7 (L) 08/03/2021    PROT 6.0 (L) 07/31/2021    LABALBU 2.9 (L) 07/31/2021    BILITOT 0.4 07/31/2021    ALKPHOS 127 (H) 07/31/2021    AST 44 (H) 07/31/2021    ALT 31 07/31/2021    LABGLOM 56 (A) 08/03/2021    GFRAA >59 08/03/2021     Lab Results   Component Value Date/Time     08/03/2021 08:41 AM    K 4.0 08/03/2021 08:41 AM     08/03/2021 08:41 AM    CO2 18 08/03/2021 08:41 AM    BUN 13 08/03/2021 08:41 AM    CREATININE 1.0 08/03/2021 08:41 AM    GLUCOSE 86 08/03/2021 08:41 AM    CALCIUM 7.7 08/03/2021 08:41 AM      No results found for: CHOL  No results found for: TRIG  No results found for: HDL  No results found for: LDLCHOLESTEROL, LDLCALC  No results found for: LABVLDL, VLDL  No results found for: CHOLHDLRATIO  No results found for: LABA1C  No results found for: EAG  Lab Results   Component Value Date    TSHFT4 0.23 (L) 08/01/2021     Lab Results   Component Value Date    VITD25 8.4 (L) 08/01/2021         Assessment:   1. Bipolar depression ([address]    2. Generalized anxiety disorder    3. Insomnia, unspecified type        No evidence of acute suicidality, homicidality or psychosis observed. Patient is psychiatrically stable. Plan:  1. A trial of Latuda. Will provide with 28-day supply of samples. She will require PA. The risks, benefits, side effects, indications, contraindications, and adverse effects of the medications have been discussed.  Yes.  2. The pt has verbalized understanding and has capacity to give informed consent. 3. The Jimmie [place] report has been reviewed according to [facility] regulations. 4. Supportive therapy offered. 5. Follow up: Return in about 6 weeks (around 11/16/2022). 6. The patient has been advised to call with any problems. Instructed to call suicide hotline, 911 or come to the ER if suicidal or symptoms worsen. 7. Controlled substance Treatment Plan: this is a tapering dose. .  8. The above listed medications have been continued, modifications in meds and other orders/labs as follows: No orders of the defined types were placed in this encounter. No orders of the defined types were placed in this encounter. 9. Additional comments:         10.Over 50% of the total visit time of  31 minutes was spent on counseling and/or coordination of care of:                        1. Bipolar depression ([facility] [address]    2. Generalized anxiety disorder    3.  Insomnia, unspecified type            Adamaris Silva MD

## 2022-10-06 ENCOUNTER — TELEPHONE (OUTPATIENT)
Dept: PSYCHIATRY | Age: 62
End: 2022-10-06

## 2022-10-06 NOTE — TELEPHONE ENCOUNTER
Spoke with [facility] staff who reported that the [facility] is covered by [facility] policy with no PA needed but the copay os $1000 for the pt. Pt contacted and given the above info. She was informed that she will continue to get samples for now.

## 2022-11-23 ENCOUNTER — TELEPHONE (OUTPATIENT)
Dept: PSYCHIATRY | Age: 62
End: 2022-11-23

## 2022-11-23 NOTE — TELEPHONE ENCOUNTER
Pt called to cancel her appt for 12/6. Asked pt would she like to reschedule her appt. Pt stated not at this time. MA asked pt what is the reason for canceling her appt. Pt stated that \"she didn't like the medicine that the provider put her on the 1st time and she didn't like the medicine that she was put on the second time' MA asked pt did she call the office and let them know. Pt stated \"the only thing I have to tell the provider is that she will not be coming back. \"    Electronically signed by Lars Toledo on 11/23/2022 at 10:01 AM

## 2023-11-13 ENCOUNTER — TRANSCRIBE ORDERS (OUTPATIENT)
Dept: ADMINISTRATIVE | Facility: HOSPITAL | Age: 63
End: 2023-11-13
Payer: MEDICARE

## 2023-11-13 DIAGNOSIS — Z87.898 HX OF CHEST PAIN: Primary | ICD-10-CM

## 2023-11-17 ENCOUNTER — HOSPITAL ENCOUNTER (OUTPATIENT)
Dept: CARDIOLOGY | Facility: HOSPITAL | Age: 63
Discharge: HOME | End: 2023-11-17
Payer: MEDICARE

## 2023-11-17 DIAGNOSIS — Z87.898 HX OF CHEST PAIN: ICD-10-CM

## 2023-11-17 PROCEDURE — 93005 ELECTROCARDIOGRAM TRACING: CPT | Performed by: NURSE PRACTITIONER

## 2023-11-20 LAB
QT INTERVAL: 344 MS
QTC INTERVAL: 430 MS

## 2024-01-12 ENCOUNTER — OFFICE VISIT (OUTPATIENT)
Dept: OBGYN CLINIC | Age: 64
End: 2024-01-12
Payer: MEDICARE

## 2024-01-12 VITALS
HEIGHT: 62 IN | HEART RATE: 88 BPM | WEIGHT: 129 LBS | BODY MASS INDEX: 23.74 KG/M2 | DIASTOLIC BLOOD PRESSURE: 64 MMHG | SYSTOLIC BLOOD PRESSURE: 100 MMHG

## 2024-01-12 DIAGNOSIS — Z11.51 ENCOUNTER FOR SCREENING FOR HUMAN PAPILLOMAVIRUS (HPV): ICD-10-CM

## 2024-01-12 DIAGNOSIS — Z76.89 ENCOUNTER TO ESTABLISH CARE: ICD-10-CM

## 2024-01-12 DIAGNOSIS — Z01.419 WELL WOMAN EXAM WITH ROUTINE GYNECOLOGICAL EXAM: Primary | ICD-10-CM

## 2024-01-12 DIAGNOSIS — Z12.31 BREAST CANCER SCREENING BY MAMMOGRAM: ICD-10-CM

## 2024-01-12 DIAGNOSIS — Z12.4 CERVICAL CANCER SCREENING: ICD-10-CM

## 2024-01-12 LAB — HPV16+18+H RISK 12 DNA SPEC-IMP: NORMAL

## 2024-01-12 PROCEDURE — G8427 DOCREV CUR MEDS BY ELIG CLIN: HCPCS

## 2024-01-12 PROCEDURE — G8420 CALC BMI NORM PARAMETERS: HCPCS

## 2024-01-12 PROCEDURE — G0101 CA SCREEN;PELVIC/BREAST EXAM: HCPCS

## 2024-01-12 RX ORDER — IBUPROFEN 800 MG/1
TABLET ORAL
COMMUNITY
Start: 2024-01-11

## 2024-01-12 RX ORDER — SUMATRIPTAN 50 MG/1
50 TABLET, FILM COATED ORAL PRN
COMMUNITY

## 2024-01-12 RX ORDER — TOPIRAMATE 50 MG/1
TABLET, FILM COATED ORAL
COMMUNITY
Start: 2023-12-14

## 2024-01-12 RX ORDER — DEXTROAMPHETAMINE SACCHARATE, AMPHETAMINE ASPARTATE, DEXTROAMPHETAMINE SULFATE AND AMPHETAMINE SULFATE 7.5; 7.5; 7.5; 7.5 MG/1; MG/1; MG/1; MG/1
TABLET ORAL
COMMUNITY
Start: 2023-12-04

## 2024-01-12 RX ORDER — FLUOXETINE HYDROCHLORIDE 40 MG/1
CAPSULE ORAL
COMMUNITY
Start: 2023-12-04

## 2024-01-12 RX ORDER — CYCLOBENZAPRINE HCL 10 MG
1 TABLET ORAL NIGHTLY
COMMUNITY
Start: 2022-03-04

## 2024-01-12 ASSESSMENT — ENCOUNTER SYMPTOMS
CONSTIPATION: 0
ABDOMINAL PAIN: 0
RESPIRATORY NEGATIVE: 1
RECTAL PAIN: 0
CHEST TIGHTNESS: 0
DIARRHEA: 0
SHORTNESS OF BREATH: 0
NAUSEA: 0
BACK PAIN: 0
GASTROINTESTINAL NEGATIVE: 1

## 2024-01-12 NOTE — PROGRESS NOTES
Pt presents today for pap smear and breast exam.      Last mammogram:   @ [facility]  Last pap smear:    Contraception:  n/a   :  2  Para:  2  AB:  0  Last bone density:    Last colonoscopy:

## 2024-01-12 NOTE — PROGRESS NOTES
[facility]  CNM Office Note    Tavia Zhang is a 63 y.o. female who presents today for her medical conditions/ complaints as noted below.  Chief Complaint   Patient presents with    Annual Exam     Last mammogram:   @ [facility]  Last pap smear:    Contraception:  n/a   :  2  Para:  2  AB:  0  Last bone density:    Last colonoscopy:     HPI  Tavia Zhang presents today for annual exam. She is due for pap smear and mammogram. She denies history of abnormal pap smears and denies history of abnormal mammograms. She reports family history of breast cancer in mother (age 70s) and first cousin (age56). She is menopausal. She denies issues with bladder or bowel. She is not sexually active. She feels her sleep pattern and quality is Good on Seroquel.     Problems/Complaints today:  1. Well woman exam with routine gynecological exam  -     Human papillomavirus (HPV) DNA probe thin prep high risk  -     ERLINDA DIGITAL SCREEN W OR WO CAD BILATERAL; Future  -     PAP SMEAR  2. Encounter to establish care  3. Cervical cancer screening  -     PAP SMEAR  4. Breast cancer screening by mammogram  -     ERLINDA DIGITAL SCREEN W OR WO CAD BILATERAL; Future  5. Encounter for screening for human papillomavirus (HPV)  -     Human papillomavirus (HPV) DNA probe thin prep high risk  -     PAP SMEAR       Patient Active Problem List   Diagnosis    AMS (altered mental status)       No LMP recorded. Patient is postmenopausal.  No obstetric history on file.    Past Medical History:   Diagnosis Date    Bipolar 1 disorder (HCC)     Depression     Hyperlipidemia      Past Surgical History:   Procedure Laterality Date    COLONOSCOPY      HAND SURGERY      KNEE SURGERY Bilateral      Family History   Problem Relation Age of Onset    Cancer Mother      Social History     Tobacco Use    Smoking status: Never    Smokeless tobacco: Never   Substance Use Topics    Alcohol use: Not Currently       Current Outpatient

## 2024-01-16 ENCOUNTER — TRANSCRIBE ORDERS (OUTPATIENT)
Dept: ADMINISTRATIVE | Facility: HOSPITAL | Age: 64
End: 2024-01-16
Payer: MEDICARE

## 2024-01-16 DIAGNOSIS — Z12.31 ENCOUNTER FOR SCREENING MAMMOGRAM FOR MALIGNANT NEOPLASM OF BREAST: Primary | ICD-10-CM

## 2024-01-22 LAB
NCCN CRITERIA FLAG: ABNORMAL
TYRER CUZICK SCORE: 13.7

## 2024-01-23 LAB
HPV HR 12 DNA SPEC QL NAA+PROBE: NOT DETECTED
HPV16 DNA SPEC QL NAA+PROBE: NOT DETECTED
HPV16+18+H RISK 12 DNA SPEC-IMP: NORMAL
HPV18 DNA SPEC QL NAA+PROBE: NOT DETECTED

## 2024-01-26 ENCOUNTER — HOSPITAL ENCOUNTER (OUTPATIENT)
Dept: MAMMOGRAPHY | Facility: HOSPITAL | Age: 64
Discharge: HOME | End: 2024-01-26
Admitting: FAMILY MEDICINE
Payer: MEDICARE

## 2024-01-26 DIAGNOSIS — Z12.31 ENCOUNTER FOR SCREENING MAMMOGRAM FOR MALIGNANT NEOPLASM OF BREAST: ICD-10-CM

## 2024-01-26 PROCEDURE — 77063 BREAST TOMOSYNTHESIS BI: CPT

## 2024-01-26 PROCEDURE — 77067 SCR MAMMO BI INCL CAD: CPT

## 2024-01-29 NOTE — PROGRESS NOTES
This patient recently took the CARE risk assessment for a mammogram appointment. Based on the patient's responses, NCCN criteria for genetic testing was met.       My attempts to reach the patient to discuss the risk assessment results have been unsuccessful.

## 2024-05-22 ENCOUNTER — TRANSCRIBE ORDERS (OUTPATIENT)
Dept: ADMINISTRATIVE | Facility: HOSPITAL | Age: 64
End: 2024-05-22
Payer: MEDICARE

## 2024-05-22 DIAGNOSIS — R41.89 COGNITIVE DEFICITS: Primary | ICD-10-CM

## 2024-06-17 ENCOUNTER — HOSPITAL ENCOUNTER (OUTPATIENT)
Dept: MRI IMAGING | Facility: HOSPITAL | Age: 64
Discharge: HOME | End: 2024-06-17
Admitting: FAMILY MEDICINE
Payer: MEDICARE

## 2024-06-17 DIAGNOSIS — R41.89 COGNITIVE DEFICITS: ICD-10-CM

## 2024-06-17 LAB — CREAT BLDA-MCNC: 1.3 MG/DL (ref 0.6–1.3)

## 2024-06-17 PROCEDURE — 70553 MRI BRAIN STEM W/O & W/DYE: CPT

## 2024-06-17 PROCEDURE — 82565 ASSAY OF CREATININE: CPT

## 2024-06-17 PROCEDURE — A9577 INJ MULTIHANCE: HCPCS | Performed by: FAMILY MEDICINE

## 2024-06-17 PROCEDURE — 0 GADOBENATE DIMEGLUMINE 529 MG/ML SOLUTION: Performed by: FAMILY MEDICINE

## 2024-06-17 RX ADMIN — GADOBENATE DIMEGLUMINE 20 ML: 529 INJECTION, SOLUTION INTRAVENOUS at 08:46

## 2024-08-29 ENCOUNTER — HOSPITAL ENCOUNTER (OUTPATIENT)
Dept: ULTRASOUND IMAGING | Age: 64
Discharge: HOME | End: 2024-08-29
Attending: INTERNAL MEDICINE
Payer: MEDICARE

## 2024-08-29 DIAGNOSIS — N18.32 STAGE 3B CHRONIC KIDNEY DISEASE (HCC): ICD-10-CM

## 2024-08-29 PROCEDURE — 76770 US EXAM ABDO BACK WALL COMP: CPT

## 2024-10-16 ENCOUNTER — TELEPHONE (OUTPATIENT)
Dept: NEUROLOGY | Facility: CLINIC | Age: 64
End: 2024-10-16
Payer: MEDICARE

## 2024-10-16 NOTE — PROGRESS NOTES
Chief Complaint    Subjective        Alyssa Dillon presents to [facility]    History of Present Illness  64-year-old female presents for follow-up because of MRI ordered by another provider.  She has not been seen here since 2022, and was seen for migraines at that time.  She reports that she has had memory issues for about 3 years, they have worsened in the last year.  Tried Prevagen.  She is been repeating things.  No issues driving.  No issues taking care of finances.  She does have trouble falling asleep and used to take Ambien.  Now on Seroquel for this.  She had an MRI ordered by PCP because of memory issues, she was also started on Aricept.             Current Outpatient Medications:     Adderall 30 MG tablet, Take 1 tablet by mouth 2 (Two) Times a Day., Disp: , Rfl:     Aricept 10 MG tablet, Take 1 tablet by mouth Daily., Disp: , Rfl:     atorvastatin (LIPITOR) 40 MG tablet, Take 1 tablet by mouth Daily., Disp: , Rfl:     Brexpiprazole (Rexulti) 0.5 MG tablet, Take 0.5 mg by mouth Daily., Disp: , Rfl:     Calcium Carb-Cholecalciferol (CALCIUM 1000 + D PO), Take  by mouth Daily., Disp: , Rfl:     cholecalciferol (Vitamin D) 25 MCG (1000 UT) tablet, Take 1 tablet by mouth Daily., Disp: , Rfl:     clonazePAM (KlonoPIN) 1 MG tablet, Take 1 tablet by mouth 3 (Three) Times a Day., Disp: , Rfl:     cyclobenzaprine (FLEXERIL) 10 MG tablet, Take 1 tablet by mouth As Needed., Disp: , Rfl:     denosumab (Prolia) 60 MG/ML solution prefilled syringe syringe, Inject 1 mL under the skin into the appropriate area as directed Every 6 (Six) Months., Disp: , Rfl:     esomeprazole (nexIUM) 40 MG capsule, Take 1 capsule by mouth Every Morning Before Breakfast., Disp: , Rfl:     HYDROcodone-acetaminophen (NORCO)  MG per tablet, Take 1 tablet by mouth Every 8 (Eight) Hours As Needed., Disp: , Rfl:     ibuprofen (ADVIL,MOTRIN) 800 MG tablet, Take 1 tablet by mouth Every 6 (Six) Hours., Disp: , Rfl:  "    levothyroxine (SYNTHROID, LEVOTHROID) 50 MCG tablet, Take 1 tablet by mouth Daily., Disp: , Rfl:     Magnesium 100 MG capsule, Take  by mouth Daily., Disp: , Rfl:     ondansetron (ZOFRAN) 8 MG tablet, Take 1 tablet by mouth As Needed., Disp: , Rfl:     PROzac 40 MG capsule, Take 1 capsule by mouth Daily., Disp: , Rfl:     QUEtiapine (SEROquel) 300 MG tablet, Take 1 tablet by mouth Every Night., Disp: , Rfl:     rizatriptan (MAXALT) 10 MG tablet, Take 1 tablet by mouth As Needed., Disp: , Rfl:     SUMAtriptan (IMITREX) 50 MG tablet, Take 1 tablet by mouth As Needed., Disp: , Rfl:     topiramate (Topamax) 50 MG tablet, Take 1 tablet by mouth 2 (Two) Times a Day for 90 days., Disp: 60 tablet, Rfl: 2       Objective   Vital Signs:   Ht 157.5 cm (62\")   Wt 49.9 kg (110 lb)   BMI 20.12 kg/m²     Physical Exam  Constitutional:       General: She is awake.   Eyes:      Extraocular Movements: Extraocular movements intact.      Pupils: Pupils are equal, round, and reactive to light.   Neurological:      Mental Status: She is alert.      Motor: Motor strength is normal.     Deep Tendon Reflexes: Reflexes are normal and symmetric.   Psychiatric:         Speech: Speech normal.      Neurological Exam  Mental Status  Awake and alert. Oriented to person, place and time. Recent and remote memory are intact. Speech is normal. Language is fluent with no aphasia. Attention and concentration are normal. Fund of knowledge is appropriate for level of education.    Cranial Nerves  CN II: Visual fields full to confrontation.  CN III, IV, VI: Extraocular movements intact bilaterally. Pupils equal round and reactive to light bilaterally.  CN V: Facial sensation is normal.  CN VII: Full and symmetric facial movement.  CN IX, X: Palate elevates symmetrically  CN XI: Shoulder shrug strength is normal.  CN XII: Tongue midline without atrophy or fasciculations.    Motor  Normal muscle bulk throughout. Normal muscle tone. No abnormal " involuntary movements. Strength is 5/5 throughout all four extremities.    Sensory  Light touch is normal in upper and lower extremities.     Reflexes  Deep tendon reflexes are 2+ and symmetric in all four extremities.    Coordination  Right: Finger-to-nose normal.Left: Finger-to-nose normal.    Gait  Casual gait is normal including stance, stride, and arm swing.      Result Review :                     Assessment and Plan   61-year-old female with bipolar disorder, hypothyroidism, ADHD, HLD, chronic pain who is now referred for memory issues and abnormal MRI brain.  I personally reviewed her MRI brain which showed a focus of T2 flair hyperintensity near the right caudate which does show hemosiderin deposition on SWI images.  There was also T2 flair abnormality in the right cerebellar hemisphere.  These were both likely small lacunar infarcts.  She also had a left cerebellar DVA.  We discussed further workup of these possible prior strokes.  Will get carotid Dopplers, as any abnormalities on CTA alone would not  since these were not acute.  She needs to be on aspirin and statin.    For her memory, I have no concerns about a neurodegenerative process.  She likely does not need Aricept.  I think her symptoms are likely multifactorial related to stress, poor sleep, and polypharmacy (Rexulti, clonazepam, Flexeril, hydrocodone, Seroquel, Topamax).  MMSE 30/30.    Plan:    1.  Carotid Dopplers.  2.  Start aspirin 81 mg daily.  3.  Is on atorvastatin 40 mg.  Needs LDL less than 70.  4.  Follow-up 3 months.    I spent 45 minutes caring for Alyssa on this date of service. This time includes time spent by me in the following activities:preparing for the visit, reviewing tests, counseling and educating the patient/family/caregiver, ordering medications, tests, or procedures, and documenting information in the medical record    Follow Up   No follow-ups on file.  Patient was given instructions and counseling  regarding her condition or for health maintenance advice. Please see specific information pulled into the AVS if appropriate.

## 2024-10-17 ENCOUNTER — OFFICE VISIT (OUTPATIENT)
Dept: NEUROLOGY | Facility: CLINIC | Age: 64
End: 2024-10-17
Payer: MEDICARE

## 2024-10-17 VITALS
DIASTOLIC BLOOD PRESSURE: 64 MMHG | SYSTOLIC BLOOD PRESSURE: 108 MMHG | BODY MASS INDEX: 20.24 KG/M2 | HEIGHT: 62 IN | WEIGHT: 110 LBS | OXYGEN SATURATION: 100 % | HEART RATE: 89 BPM

## 2024-10-17 DIAGNOSIS — Z86.73 HISTORY OF STROKE: Primary | ICD-10-CM

## 2024-10-17 PROCEDURE — 1159F MED LIST DOCD IN RCRD: CPT | Performed by: PSYCHIATRY & NEUROLOGY

## 2024-10-17 PROCEDURE — 1160F RVW MEDS BY RX/DR IN RCRD: CPT | Performed by: PSYCHIATRY & NEUROLOGY

## 2024-10-17 PROCEDURE — 99215 OFFICE O/P EST HI 40 MIN: CPT | Performed by: PSYCHIATRY & NEUROLOGY

## 2024-10-17 RX ORDER — IBUPROFEN 800 MG/1
800 TABLET, FILM COATED ORAL EVERY 6 HOURS SCHEDULED
COMMUNITY

## 2024-10-17 RX ORDER — DONEPEZIL HYDROCHLORIDE 10 MG/1
10 TABLET, FILM COATED ORAL EVERY 24 HOURS
COMMUNITY

## 2024-10-17 RX ORDER — FLUOXETINE HYDROCHLORIDE 40 MG/1
1 CAPSULE ORAL EVERY 24 HOURS
COMMUNITY
Start: 2024-10-14

## 2024-10-17 RX ORDER — DENOSUMAB 60 MG/ML
60 INJECTION SUBCUTANEOUS
COMMUNITY

## 2024-10-17 RX ORDER — DEXTROAMPHETAMINE SACCHARATE, AMPHETAMINE ASPARTATE, DEXTROAMPHETAMINE SULFATE, AND AMPHETAMINE SULFATE 7.5; 7.5; 7.5; 7.5 MG/1; MG/1; MG/1; MG/1
30 TABLET ORAL 2 TIMES DAILY
COMMUNITY
Start: 2024-10-14

## 2024-10-17 RX ORDER — BREXPIPRAZOLE 0.5 MG/1
1 TABLET ORAL DAILY
COMMUNITY
Start: 2024-10-14

## 2024-10-17 RX ORDER — CHOLECALCIFEROL (VITAMIN D3) 25 MCG
1000 TABLET ORAL DAILY
COMMUNITY

## 2024-10-17 RX ORDER — QUETIAPINE FUMARATE 300 MG
300 TABLET ORAL NIGHTLY
COMMUNITY
Start: 2024-10-14 | End: 2024-10-17

## 2024-12-10 ENCOUNTER — HOSPITAL ENCOUNTER (OUTPATIENT)
Dept: ULTRASOUND IMAGING | Facility: HOSPITAL | Age: 64
Discharge: HOME | End: 2024-12-10
Admitting: PSYCHIATRY & NEUROLOGY
Payer: MEDICARE

## 2024-12-10 DIAGNOSIS — Z86.73 HISTORY OF STROKE: ICD-10-CM

## 2024-12-10 PROCEDURE — 93880 EXTRACRANIAL BILAT STUDY: CPT

## 2025-02-19 ENCOUNTER — TELEPHONE (OUTPATIENT)
Dept: NEUROLOGY | Facility: CLINIC | Age: 65
End: 2025-02-19
Payer: MEDICARE

## 2025-02-19 NOTE — TELEPHONE ENCOUNTER
Tried to call pt to get her cancelled appointment rescheduled with [place]. Phone was disconnected.

## 2025-03-11 NOTE — PROGRESS NOTES
Chief Complaint   Patient presents with    Colonoscopy     Time for colon had last colon 2022 by dr. esteves       PCP: Debra Sierra APRN  REFER: Debra Sierra APRN    Subjective       HPI    Alyssa Dillon presents to office with complaints of heartburn.  Symptoms have been present on  occasional  basis for over month.  She has been awakened from sleep with acid reflux.   Symptoms are not aggravated by eating. There is not dysphagia.   There has not been weight loss.   Alyssa Dillon states she was diagnosed with EoE in past.      Bowels move without difficulty.  No signs of GI blood loss.  No abdominal pain.  Personal history of colon polyps.            Past Medical History:   Diagnosis Date    Bipolar 2 disorder     Headache, tension-type Several years    Hyperlipidemia Several years    Memory loss About 5 years +    Migraine     Osteoarthritis     Osteoporosis     Vision loss Wear glasses since age 4     Past Surgical History:   Procedure Laterality Date    BREAST BIOPSY Right     JOINT REPLACEMENT      KNEE SURGERY      THUMB ARTHROSCOPY      TUBAL ABDOMINAL LIGATION       Outpatient Medications Marked as Taking for the 3/13/25 encounter (Office Visit) with Kenneth Thompson APRN   Medication Sig Dispense Refill    Adderall 30 MG tablet Take 1 tablet by mouth 2 (Two) Times a Day.      Aricept 10 MG tablet Take 1 tablet by mouth Daily.      atorvastatin (LIPITOR) 40 MG tablet Take 1 tablet by mouth Daily.      Brexpiprazole (Rexulti) 0.5 MG tablet Take 0.5 mg by mouth Daily.      Calcium Carb-Cholecalciferol (CALCIUM 1000 + D PO) Take  by mouth 2 (Two) Times a Day.      cholecalciferol (Vitamin D) 25 MCG (1000 UT) tablet Take 1 tablet by mouth Daily.      clonazePAM (KlonoPIN) 1 MG tablet Take 1 tablet by mouth 3 (Three) Times a Day.      cyclobenzaprine (FLEXERIL) 10 MG tablet Take 1 tablet by mouth As Needed.      denosumab (Prolia) 60 MG/ML solution prefilled syringe syringe Inject 1 mL under the skin  into the appropriate area as directed Every 6 (Six) Months.      esomeprazole (nexIUM) 40 MG capsule Take 1 capsule by mouth Every Morning Before Breakfast.      HYDROcodone-acetaminophen (NORCO)  MG per tablet Take 1 tablet by mouth Every 8 (Eight) Hours As Needed.      ibuprofen (ADVIL,MOTRIN) 800 MG tablet Take 1 tablet by mouth Every 6 (Six) Hours.      levothyroxine (SYNTHROID, LEVOTHROID) 50 MCG tablet Take 1 tablet by mouth Daily.      Magnesium 100 MG capsule Take  by mouth Daily.      ondansetron (ZOFRAN) 8 MG tablet Take 1 tablet by mouth As Needed.      PROzac 40 MG capsule Take 1 capsule by mouth Daily.      QUEtiapine (SEROquel) 300 MG tablet Take 1 tablet by mouth Every Night.      rizatriptan (MAXALT) 10 MG tablet Take 1 tablet by mouth As Needed.      SUMAtriptan (IMITREX) 50 MG tablet Take 1 tablet by mouth As Needed.       Allergies   Allergen Reactions    Sulfa Antibiotics Hives      -      Social History     Socioeconomic History    Marital status:    Tobacco Use    Smoking status: Former     Current packs/day: 1.00     Average packs/day: 1 pack/day for 3.0 years (3.0 ttl pk-yrs)     Types: Cigarettes, Electronic Cigarette    Smokeless tobacco: Never    Tobacco comments:     Age 49 to age 52   Vaping Use    Vaping status: Former   Substance and Sexual Activity    Alcohol use: Not Currently    Drug use: Yes     Frequency: 5.0 times per week     Types: Marijuana     Comment: rare    Sexual activity: Not Currently     Birth control/protection: None, Tubal ligation     Review of Systems   Constitutional:  Negative for fever and unexpected weight change.   HENT:  Negative for trouble swallowing.    Respiratory:  Negative for shortness of breath.    Cardiovascular:  Negative for chest pain.   Gastrointestinal:  Negative for abdominal pain and anal bleeding.   Objective   Vitals:    03/13/25 1223   BP: 110/68   Pulse: 84   Temp: 97.3 °F (36.3 °C)   SpO2: 99%   Weight: 51.7 kg (114 lb)  "  Height: 157.5 cm (62\")     Physical Exam  Constitutional:       Appearance: Normal appearance. She is well-developed.   Eyes:      General: No scleral icterus.  Cardiovascular:      Heart sounds: Normal heart sounds. No murmur heard.  Pulmonary:      Effort: Pulmonary effort is normal.   Abdominal:      General: Bowel sounds are normal. There is no distension.      Palpations: Abdomen is soft.      Tenderness: There is no abdominal tenderness. There is no guarding.   Skin:     General: Skin is warm and dry.      Coloration: Skin is not jaundiced.   Neurological:      Mental Status: She is alert.   Psychiatric:         Behavior: Behavior is cooperative.       Imaging Results (Most Recent)       None          Body mass index is 20.85 kg/m².  Assessment & Plan   Diagnoses and all orders for this visit:    1. Gastroesophageal reflux disease, unspecified whether esophagitis present (Primary)  -     Case Request; Standing  -     Implement Anesthesia Orders Day of Procedure; Standing  -     Follow Anesthesia Guidelines / Protocol; Future  -     Verify bowel prep was successful; Standing  -     Give tap water enema if bowel prep was insufficient; Standing  -     Case Request    2. History of colon polyps  -     Case Request; Standing  -     Implement Anesthesia Orders Day of Procedure; Standing  -     Follow Anesthesia Guidelines / Protocol; Future  -     Verify bowel prep was successful; Standing  -     Give tap water enema if bowel prep was insufficient; Standing  -     Case Request        COLONOSCOPY WITH ANESTHESIA (examination of colon) (N/A), ESOPHAGOGASTRODUODENOSCOPY WITH ANESTHESIA-(examination of esophagus, stomach, top portion of small intestine) (N/A)    Miralax prep   I have suggested utilizing Miralax starting 7 days prior to colonoscopy to help improve prep.  I have explained stool may be loose but we do not want Alyssa Long to be uncomfortable or experiencing fecal incontinence.  Miralax should be adjusted " as needed.      Take PPI 30 min prior to breakfast   Decrease caffeine, nicotine, etoh- all contribute to acid reflux  Do not eat 2-3 hours within lying down, elevated HOB 4-6 inches    Continue nexium as currently prescribed    I have requested records from previous provider, Dr Rodas office      I have explained having an adequate and complete prep is associated with success of colonoscopy.   I have explained to Alyssa Dillon that not having an adequate bowel prep can lead to decreased rate of  adenoma detection, longer procedure times, and higher chance the physician will not be able to successfully complete full colonoscopy (able to intubate cecum).   I have discussed bowel prep options miralax prep and Golytley.  Alyssa Dillon has elected to proceed with miralax prep.   I have also discussed that there are a variety of prep options however prep that is prescribed is influenced on patient health history, how well bowels move on regular basis, and individualized insurance plan.  I have explained that we are not able to know the amount of coverage each individual insurance plan provides for preps.  I asked Alyssa Dillon to please call our office if we need to send prescription/provide instructions for another prep due to costs/insurance coverage.        Kenneth Thompson, APRN  03/13/25      Gastroesophageal Reflux Disease, Adult    Gastroesophageal reflux disease (GERD) happens when acid from your stomach flows up into the esophagus. When acid comes in contact with the esophagus, the acid causes soreness (inflammation) in the esophagus. Over time, GERD may create small holes (ulcers) in the lining of the esophagus.  CAUSES    Increased body weight. This puts pressure on the stomach, making acid rise from the stomach into the esophagus.  Smoking. This increases acid production in the stomach.  Drinking alcohol. This causes decreased pressure in the lower esophageal sphincter (valve or ring of muscle between the  esophagus and stomach), allowing acid from the stomach into the esophagus.  Late evening meals and a full stomach. This increases pressure and acid production in the stomach.  A malformed lower esophageal sphincter.  Sometimes, no cause is found.  SYMPTOMS    Burning pain in the lower part of the mid-chest behind the breastbone and in the mid-stomach area. This may occur twice a week or more often.  Trouble swallowing.  Sore throat.  Dry cough.  Asthma-like symptoms including chest tightness, shortness of breath, or wheezing.  DIAGNOSIS    Your caregiver may be able to diagnose GERD based on your symptoms. In some cases, X-rays and other tests may be done to check for complications or to check the condition of your stomach and esophagus.  TREATMENT    Your caregiver may recommend over-the-counter or prescription medicines to help decrease acid production. Ask your caregiver before starting or adding any new medicines.    HOME CARE INSTRUCTIONS    Change the factors that you can control. Ask your caregiver for guidance concerning weight loss, quitting smoking, and alcohol consumption.  Avoid foods and drinks that make your symptoms worse, such as:  Caffeine or alcoholic drinks.  Chocolate.  Peppermint or mint flavorings.  Garlic and onions.  Spicy foods.  Citrus fruits, such as oranges, lisa, or limes.  Tomato-based foods such as sauce, chili, salsa, and pizza.  Fried and fatty foods.  Avoid lying down for the 3 hours prior to your bedtime or prior to taking a nap.  Eat small, frequent meals instead of large meals.  Wear loose-fitting clothing. Do not wear anything tight around your waist that causes pressure on your stomach.  Raise the head of your bed 6 to 8 inches with wood blocks to help you sleep. Extra pillows will not help.  Only take over-the-counter or prescription medicines for pain, discomfort, or fever as directed by your caregiver.  Do not take aspirin, ibuprofen, or other nonsteroidal  anti-inflammatory drugs (NSAIDs).  SEEK IMMEDIATE MEDICAL CARE IF:    You have pain in your arms, neck, jaw, teeth, or back.  Your pain increases or changes in intensity or duration.  You develop nausea, vomiting, or sweating (diaphoresis).  You develop shortness of breath, or you faint.  Your vomit is green, yellow, black, or looks like coffee grounds or blood.  Your stool is red, bloody, or black.  These symptoms could be signs of other problems, such as heart disease, gastric bleeding, or esophageal bleeding.  MAKE SURE YOU:    Understand these instructions.  Will watch your condition.  Will get help right away if you are not doing well or get worse.     This information is not intended to replace advice given to you by your health care provider. Make sure you discuss any questions you have with your health care provider.     Document Released: 09/27/2006 Document Revised: 01/08/2016 Document Reviewed: 04/13/2016  [employer] Patient Education ©2016 [employer]

## 2025-03-13 ENCOUNTER — OFFICE VISIT (OUTPATIENT)
Dept: GASTROENTEROLOGY | Facility: CLINIC | Age: 65
End: 2025-03-13
Payer: MEDICARE

## 2025-03-13 ENCOUNTER — PATIENT ROUNDING (BHMG ONLY) (OUTPATIENT)
Dept: GASTROENTEROLOGY | Facility: CLINIC | Age: 65
End: 2025-03-13
Payer: MEDICARE

## 2025-03-13 VITALS
SYSTOLIC BLOOD PRESSURE: 110 MMHG | BODY MASS INDEX: 20.98 KG/M2 | OXYGEN SATURATION: 99 % | WEIGHT: 114 LBS | HEIGHT: 62 IN | HEART RATE: 84 BPM | DIASTOLIC BLOOD PRESSURE: 68 MMHG | TEMPERATURE: 97.3 F

## 2025-03-13 DIAGNOSIS — K21.9 GASTROESOPHAGEAL REFLUX DISEASE, UNSPECIFIED WHETHER ESOPHAGITIS PRESENT: Primary | ICD-10-CM

## 2025-03-13 DIAGNOSIS — Z86.0100 HISTORY OF COLON POLYPS: ICD-10-CM

## 2025-03-13 NOTE — PROGRESS NOTES
March 13, 2025    Hello, may I speak with Alyssa Santana?    My name is BAKARI      I am  with [facility]  [address], [address]  [place] [address] [phone].    Before we get started may I verify your date of birth? 1960    I am calling to officially welcome you to our practice and ask about your recent visit. Is this a good time to talk? yes    Tell me about your visit with us. What things went well?  GREAT       We're always looking for ways to make our patients' experiences even better. Do you have recommendations on ways we may improve?  no    Overall were you satisfied with your first visit to our practice? yes       I appreciate you taking the time to speak with me today. Is there anything else I can do for you? no      Thank you, and have a great day.

## 2025-06-23 ENCOUNTER — TELEPHONE (OUTPATIENT)
Dept: GASTROENTEROLOGY | Facility: CLINIC | Age: 65
End: 2025-06-23
Payer: MEDICARE

## 2025-06-23 NOTE — TELEPHONE ENCOUNTER
Spoke with patient today, patient stated she was sick. Cancel Colon/ EGD with Dr. Ferrer. Did not reschedule.   Sent letter to Debra Sierra APRN    Patient was scheduled for 04/29 - Cancel  Rescheduled to 06/24/2025 - Cancel